# Patient Record
Sex: MALE | URBAN - METROPOLITAN AREA
[De-identification: names, ages, dates, MRNs, and addresses within clinical notes are randomized per-mention and may not be internally consistent; named-entity substitution may affect disease eponyms.]

---

## 2021-07-08 ENCOUNTER — EMERGENCY (EMERGENCY)
Facility: HOSPITAL | Age: 76
LOS: 1 days | End: 2021-07-08
Admitting: EMERGENCY MEDICINE
Payer: MEDICARE

## 2021-07-08 PROCEDURE — 73090 X-RAY EXAM OF FOREARM: CPT | Mod: 26,LT,76

## 2021-07-08 PROCEDURE — 93010 ELECTROCARDIOGRAM REPORT: CPT

## 2021-07-08 PROCEDURE — 72125 CT NECK SPINE W/O DYE: CPT | Mod: 26

## 2021-07-08 PROCEDURE — 71260 CT THORAX DX C+: CPT | Mod: 26

## 2021-07-08 PROCEDURE — 73080 X-RAY EXAM OF ELBOW: CPT | Mod: 26,LT,76

## 2021-07-08 PROCEDURE — 73130 X-RAY EXAM OF HAND: CPT | Mod: 26,RT

## 2021-07-08 PROCEDURE — 72170 X-RAY EXAM OF PELVIS: CPT | Mod: 26

## 2021-07-08 PROCEDURE — 99291 CRITICAL CARE FIRST HOUR: CPT

## 2021-07-08 PROCEDURE — 73030 X-RAY EXAM OF SHOULDER: CPT | Mod: 26,RT

## 2021-07-08 PROCEDURE — 74177 CT ABD & PELVIS W/CONTRAST: CPT | Mod: 26

## 2021-07-08 PROCEDURE — 70450 CT HEAD/BRAIN W/O DYE: CPT | Mod: 26

## 2021-07-08 PROCEDURE — 70486 CT MAXILLOFACIAL W/O DYE: CPT | Mod: 26

## 2021-07-08 PROCEDURE — 71045 X-RAY EXAM CHEST 1 VIEW: CPT | Mod: 26

## 2021-07-09 ENCOUNTER — INPATIENT (INPATIENT)
Facility: HOSPITAL | Age: 76
LOS: 2 days | Discharge: HOME | DRG: 52 | End: 2021-07-12
Attending: SURGERY | Admitting: SURGERY
Payer: MEDICARE

## 2021-07-09 VITALS
SYSTOLIC BLOOD PRESSURE: 147 MMHG | DIASTOLIC BLOOD PRESSURE: 90 MMHG | TEMPERATURE: 98 F | HEART RATE: 96 BPM | RESPIRATION RATE: 18 BRPM | OXYGEN SATURATION: 95 %

## 2021-07-09 DIAGNOSIS — S14.129A CENTRAL CORD SYNDROME AT UNSPECIFIED LEVEL OF CERVICAL SPINAL CORD, INITIAL ENCOUNTER: ICD-10-CM

## 2021-07-09 LAB
ALBUMIN SERPL ELPH-MCNC: 4.6 G/DL — SIGNIFICANT CHANGE UP (ref 3.3–5.2)
ALP SERPL-CCNC: 63 U/L — SIGNIFICANT CHANGE UP (ref 40–120)
ALT FLD-CCNC: 27 U/L — SIGNIFICANT CHANGE UP
ANION GAP SERPL CALC-SCNC: 16 MMOL/L — SIGNIFICANT CHANGE UP (ref 5–17)
APTT BLD: 33.6 SEC — SIGNIFICANT CHANGE UP (ref 27.5–35.5)
AST SERPL-CCNC: 76 U/L — HIGH
BILIRUB SERPL-MCNC: 0.5 MG/DL — SIGNIFICANT CHANGE UP (ref 0.4–2)
BUN SERPL-MCNC: 19.6 MG/DL — SIGNIFICANT CHANGE UP (ref 8–20)
CALCIUM SERPL-MCNC: 9.8 MG/DL — SIGNIFICANT CHANGE UP (ref 8.6–10.2)
CHLORIDE SERPL-SCNC: 99 MMOL/L — SIGNIFICANT CHANGE UP (ref 98–107)
CO2 SERPL-SCNC: 18 MMOL/L — LOW (ref 22–29)
CREAT SERPL-MCNC: 0.8 MG/DL — SIGNIFICANT CHANGE UP (ref 0.5–1.3)
GLUCOSE SERPL-MCNC: 128 MG/DL — HIGH (ref 70–99)
HCT VFR BLD CALC: 42.2 % — SIGNIFICANT CHANGE UP (ref 39–50)
HGB BLD-MCNC: 14.8 G/DL — SIGNIFICANT CHANGE UP (ref 13–17)
INR BLD: 0.9 RATIO — SIGNIFICANT CHANGE UP (ref 0.88–1.16)
LACTATE SERPL-SCNC: 1.1 MMOL/L — SIGNIFICANT CHANGE UP (ref 0.5–2)
MCHC RBC-ENTMCNC: 32.2 PG — SIGNIFICANT CHANGE UP (ref 27–34)
MCHC RBC-ENTMCNC: 35.1 GM/DL — SIGNIFICANT CHANGE UP (ref 32–36)
MCV RBC AUTO: 91.9 FL — SIGNIFICANT CHANGE UP (ref 80–100)
PLATELET # BLD AUTO: 339 K/UL — SIGNIFICANT CHANGE UP (ref 150–400)
POTASSIUM SERPL-MCNC: 4.1 MMOL/L — SIGNIFICANT CHANGE UP (ref 3.5–5.3)
POTASSIUM SERPL-SCNC: 4.1 MMOL/L — SIGNIFICANT CHANGE UP (ref 3.5–5.3)
PROT SERPL-MCNC: 7 G/DL — SIGNIFICANT CHANGE UP (ref 6.6–8.7)
PROTHROM AB SERPL-ACNC: 10.5 SEC — LOW (ref 10.6–13.6)
RBC # BLD: 4.59 M/UL — SIGNIFICANT CHANGE UP (ref 4.2–5.8)
RBC # FLD: 12.7 % — SIGNIFICANT CHANGE UP (ref 10.3–14.5)
SARS-COV-2 RNA SPEC QL NAA+PROBE: SIGNIFICANT CHANGE UP
SODIUM SERPL-SCNC: 133 MMOL/L — LOW (ref 135–145)
WBC # BLD: 10.22 K/UL — SIGNIFICANT CHANGE UP (ref 3.8–10.5)
WBC # FLD AUTO: 10.22 K/UL — SIGNIFICANT CHANGE UP (ref 3.8–10.5)

## 2021-07-09 PROCEDURE — 93010 ELECTROCARDIOGRAM REPORT: CPT

## 2021-07-09 PROCEDURE — 73130 X-RAY EXAM OF HAND: CPT | Mod: 26,LT

## 2021-07-09 PROCEDURE — 72157 MRI CHEST SPINE W/O & W/DYE: CPT | Mod: 26,MA

## 2021-07-09 PROCEDURE — 99223 1ST HOSP IP/OBS HIGH 75: CPT | Mod: GC

## 2021-07-09 PROCEDURE — 99285 EMERGENCY DEPT VISIT HI MDM: CPT

## 2021-07-09 PROCEDURE — 72156 MRI NECK SPINE W/O & W/DYE: CPT | Mod: 26

## 2021-07-09 PROCEDURE — 99222 1ST HOSP IP/OBS MODERATE 55: CPT

## 2021-07-09 PROCEDURE — 73130 X-RAY EXAM OF HAND: CPT | Mod: 26,RT,77

## 2021-07-09 RX ORDER — GABAPENTIN 400 MG/1
100 CAPSULE ORAL ONCE
Refills: 0 | Status: COMPLETED | OUTPATIENT
Start: 2021-07-09 | End: 2021-07-09

## 2021-07-09 RX ORDER — DEXTROSE 50 % IN WATER 50 %
25 SYRINGE (ML) INTRAVENOUS ONCE
Refills: 0 | Status: DISCONTINUED | OUTPATIENT
Start: 2021-07-09 | End: 2021-07-09

## 2021-07-09 RX ORDER — GABAPENTIN 400 MG/1
300 CAPSULE ORAL EVERY 8 HOURS
Refills: 0 | Status: DISCONTINUED | OUTPATIENT
Start: 2021-07-09 | End: 2021-07-12

## 2021-07-09 RX ORDER — GABAPENTIN 400 MG/1
300 CAPSULE ORAL EVERY 8 HOURS
Refills: 0 | Status: DISCONTINUED | OUTPATIENT
Start: 2021-07-09 | End: 2021-07-09

## 2021-07-09 RX ORDER — NICOTINE POLACRILEX 2 MG
1 GUM BUCCAL DAILY
Refills: 0 | Status: DISCONTINUED | OUTPATIENT
Start: 2021-07-09 | End: 2021-07-12

## 2021-07-09 RX ORDER — HYDROMORPHONE HYDROCHLORIDE 2 MG/ML
0.5 INJECTION INTRAMUSCULAR; INTRAVENOUS; SUBCUTANEOUS ONCE
Refills: 0 | Status: DISCONTINUED | OUTPATIENT
Start: 2021-07-09 | End: 2021-07-09

## 2021-07-09 RX ORDER — SODIUM CHLORIDE 9 MG/ML
1000 INJECTION, SOLUTION INTRAVENOUS
Refills: 0 | Status: DISCONTINUED | OUTPATIENT
Start: 2021-07-09 | End: 2021-07-09

## 2021-07-09 RX ORDER — SODIUM CHLORIDE 9 MG/ML
1000 INJECTION INTRAMUSCULAR; INTRAVENOUS; SUBCUTANEOUS
Refills: 0 | Status: DISCONTINUED | OUTPATIENT
Start: 2021-07-09 | End: 2021-07-10

## 2021-07-09 RX ORDER — PANTOPRAZOLE SODIUM 20 MG/1
40 TABLET, DELAYED RELEASE ORAL DAILY
Refills: 0 | Status: DISCONTINUED | OUTPATIENT
Start: 2021-07-09 | End: 2021-07-09

## 2021-07-09 RX ORDER — LANOLIN ALCOHOL/MO/W.PET/CERES
3 CREAM (GRAM) TOPICAL AT BEDTIME
Refills: 0 | Status: DISCONTINUED | OUTPATIENT
Start: 2021-07-09 | End: 2021-07-10

## 2021-07-09 RX ORDER — GLUCAGON INJECTION, SOLUTION 0.5 MG/.1ML
1 INJECTION, SOLUTION SUBCUTANEOUS ONCE
Refills: 0 | Status: DISCONTINUED | OUTPATIENT
Start: 2021-07-09 | End: 2021-07-09

## 2021-07-09 RX ORDER — ACETAMINOPHEN 500 MG
650 TABLET ORAL EVERY 6 HOURS
Refills: 0 | Status: DISCONTINUED | OUTPATIENT
Start: 2021-07-09 | End: 2021-07-10

## 2021-07-09 RX ORDER — LANOLIN ALCOHOL/MO/W.PET/CERES
5 CREAM (GRAM) TOPICAL ONCE
Refills: 0 | Status: COMPLETED | OUTPATIENT
Start: 2021-07-09 | End: 2021-07-09

## 2021-07-09 RX ORDER — DEXTROSE 50 % IN WATER 50 %
15 SYRINGE (ML) INTRAVENOUS ONCE
Refills: 0 | Status: DISCONTINUED | OUTPATIENT
Start: 2021-07-09 | End: 2021-07-09

## 2021-07-09 RX ORDER — DEXTROSE 50 % IN WATER 50 %
12.5 SYRINGE (ML) INTRAVENOUS ONCE
Refills: 0 | Status: DISCONTINUED | OUTPATIENT
Start: 2021-07-09 | End: 2021-07-09

## 2021-07-09 RX ORDER — ENOXAPARIN SODIUM 100 MG/ML
30 INJECTION SUBCUTANEOUS
Refills: 0 | Status: DISCONTINUED | OUTPATIENT
Start: 2021-07-09 | End: 2021-07-12

## 2021-07-09 RX ORDER — TRAMADOL HYDROCHLORIDE 50 MG/1
50 TABLET ORAL ONCE
Refills: 0 | Status: DISCONTINUED | OUTPATIENT
Start: 2021-07-09 | End: 2021-07-09

## 2021-07-09 RX ORDER — MORPHINE SULFATE 50 MG/1
4 CAPSULE, EXTENDED RELEASE ORAL ONCE
Refills: 0 | Status: DISCONTINUED | OUTPATIENT
Start: 2021-07-09 | End: 2021-07-09

## 2021-07-09 RX ORDER — DEXAMETHASONE 0.5 MG/5ML
4 ELIXIR ORAL EVERY 6 HOURS
Refills: 0 | Status: DISCONTINUED | OUTPATIENT
Start: 2021-07-09 | End: 2021-07-12

## 2021-07-09 RX ORDER — PANTOPRAZOLE SODIUM 20 MG/1
40 TABLET, DELAYED RELEASE ORAL
Refills: 0 | Status: DISCONTINUED | OUTPATIENT
Start: 2021-07-09 | End: 2021-07-12

## 2021-07-09 RX ORDER — KETOROLAC TROMETHAMINE 30 MG/ML
15 SYRINGE (ML) INJECTION ONCE
Refills: 0 | Status: DISCONTINUED | OUTPATIENT
Start: 2021-07-09 | End: 2021-07-09

## 2021-07-09 RX ORDER — INSULIN LISPRO 100/ML
VIAL (ML) SUBCUTANEOUS
Refills: 0 | Status: DISCONTINUED | OUTPATIENT
Start: 2021-07-09 | End: 2021-07-10

## 2021-07-09 RX ADMIN — MORPHINE SULFATE 4 MILLIGRAM(S): 50 CAPSULE, EXTENDED RELEASE ORAL at 03:26

## 2021-07-09 RX ADMIN — Medication 650 MILLIGRAM(S): at 21:27

## 2021-07-09 RX ADMIN — Medication 4 MILLIGRAM(S): at 23:24

## 2021-07-09 RX ADMIN — GABAPENTIN 100 MILLIGRAM(S): 400 CAPSULE ORAL at 08:55

## 2021-07-09 RX ADMIN — Medication 15 MILLIGRAM(S): at 15:29

## 2021-07-09 RX ADMIN — GABAPENTIN 300 MILLIGRAM(S): 400 CAPSULE ORAL at 14:06

## 2021-07-09 RX ADMIN — HYDROMORPHONE HYDROCHLORIDE 0.5 MILLIGRAM(S): 2 INJECTION INTRAMUSCULAR; INTRAVENOUS; SUBCUTANEOUS at 14:05

## 2021-07-09 RX ADMIN — HYDROMORPHONE HYDROCHLORIDE 0.5 MILLIGRAM(S): 2 INJECTION INTRAMUSCULAR; INTRAVENOUS; SUBCUTANEOUS at 14:17

## 2021-07-09 RX ADMIN — Medication 15 MILLIGRAM(S): at 21:15

## 2021-07-09 RX ADMIN — ENOXAPARIN SODIUM 30 MILLIGRAM(S): 100 INJECTION SUBCUTANEOUS at 17:44

## 2021-07-09 RX ADMIN — Medication 650 MILLIGRAM(S): at 22:30

## 2021-07-09 RX ADMIN — Medication 650 MILLIGRAM(S): at 14:06

## 2021-07-09 RX ADMIN — Medication 4 MILLIGRAM(S): at 17:45

## 2021-07-09 RX ADMIN — Medication 15 MILLIGRAM(S): at 15:32

## 2021-07-09 RX ADMIN — GABAPENTIN 300 MILLIGRAM(S): 400 CAPSULE ORAL at 21:25

## 2021-07-09 RX ADMIN — Medication 5 MILLIGRAM(S): at 23:51

## 2021-07-09 RX ADMIN — MORPHINE SULFATE 4 MILLIGRAM(S): 50 CAPSULE, EXTENDED RELEASE ORAL at 03:56

## 2021-07-09 RX ADMIN — Medication 4 MILLIGRAM(S): at 14:18

## 2021-07-09 RX ADMIN — Medication 650 MILLIGRAM(S): at 14:17

## 2021-07-09 RX ADMIN — Medication 3 MILLIGRAM(S): at 21:25

## 2021-07-09 RX ADMIN — Medication 15 MILLIGRAM(S): at 20:44

## 2021-07-09 RX ADMIN — PANTOPRAZOLE SODIUM 40 MILLIGRAM(S): 20 TABLET, DELAYED RELEASE ORAL at 14:05

## 2021-07-09 NOTE — CONSULT NOTE ADULT - ASSESSMENT
Patient is a 76y old Male w/ HTN and HLD presents as transfer from [facility] for further evaluation of suspected central cord syndrome. Work-up revealed no acute injuries and MRI performed here shows active degenerative disease. Abrasions to left side of face with noted hemostasis. No acute, surgical injuries identified.     #S/p Fall Down Steps  - No acute operative injuries identified.   - Fascial abrasions do not require repair, local wound care.  - XR of Bilateral hands without obvious fracture, to follow up final read  - Tertiary exam  - Neurosurgery consulted    Plan discussed with Dr. Pérez.   Consulted at 8:45, seen and examined at 9:40.

## 2021-07-09 NOTE — H&P ADULT - NSHPPHYSICALEXAM_GEN_ALL_CORE
General: NAD  HEENT: Normocephalic, EOMI, Abrasions over left forehead, periorbital region, and lips. Hemostasis noted. No laceration for repair.  Neck: Soft collar in place, midline trachea.  Chest: No chest wall tenderness.   Cardiac: RRR  Respiratory: Unlabored, no conversational dyspnea.   Abdomen: Soft, non-distended, non-tender TTP periumbilically, no rebound, +guarding  Groin: Normal appearing  Ext: Right upper extremity with some contracture as baseline. Left upper extremity with flexion, motor function intact. Hypersensitive from elbow to hand bilaterally. WWP  Back: no TTP, no palpable runoff/stepoff/deformity

## 2021-07-09 NOTE — H&P ADULT - HISTORY OF PRESENT ILLNESS
Patient is a 76y old  Male w/ reported PMH of HTN and hLD presents as transfer from [facility] for further evaluation of suspected central cord syndrome. Patient was babysitting his grandchildren when he fell forward down 3 concrete steps, landing on his face and hands. Did not lose consciousness. Was able to ambulate afterwards. Complaining of pain at both hands with hypersensitivity from elbow to hand bilaterally. Work-up at [facility] yielded no acute injuries, transferred to [facility] for MRI. Denies Nausea, vomiting, fever, chills, chest pain, or SOB. C-collar switched for soft collar.     Primary Survey:   A - airway intact, having conversation  B - Unlabored, no conversational dyspnea  C - initial BP  BP: 134/89 (07-09-21 @ 06:33) *** , HR HR: 90 (07-09-21 @ 06:33) *** ,  D - GCS 15 on arrival  Exposure obtained

## 2021-07-09 NOTE — ED ADULT NURSE NOTE - NSIMPLEMENTINTERV_GEN_ALL_ED
Implemented All Fall Risk Interventions:  [place] to call system. Call bell, personal items and telephone within reach. Instruct patient to call for assistance. Room bathroom lighting operational. Non-slip footwear when patient is off stretcher. Physically safe environment: no spills, clutter or unnecessary equipment. Stretcher in lowest position, wheels locked, appropriate side rails in place. Provide visual cue, wrist band, yellow gown, etc. Monitor gait and stability. Monitor for mental status changes and reorient to person, place, and time. Review medications for side effects contributing to fall risk. Reinforce activity limits and safety measures with patient and family.

## 2021-07-09 NOTE — CONSULT NOTE ADULT - SUBJECTIVE AND OBJECTIVE BOX
CC: Patient is a 76y old  Male who presents with a chief complaint of   SOURCE:   HPI:  Patient is a 76y old  Male who presents S/P  trans from [facility]  fall to face    pt c/o + -headache  /10 on the pain scale   + - Nausea /+ - Vomiting  admits denies weakness  admits denies numbness/ tingling  admist denies visual changes  admist denies C/T/LS  Spine pain    PAST MEDICAL:     SURGICAL HISTORY:      SOCIAL HISTORY: +  EtOH, + - tobacco, + - drugs    FAMILY HISTORY:      ROS:  CONSTITUTIONAL: No fever, weight loss, or fatigue  EYES: No eye pain, visual disturbances, or discharge  ENMT:  No difficulty hearing, tinnitus, vertigo; No sinus or throat pain  NECK: No pain or stiffness  RESPIRATORY: No cough, wheezing, chills or hemoptysis; No shortness of breath  CARDIOVASCULAR: No chest pain, palpitations, dizziness, or leg swelling  GASTROINTESTINAL: No abdominal or epigastric pain. No nausea, vomiting, or hematemesis; No diarrhea or constipation. No melena or hematochezia.  GENITOURINARY: No dysuria, frequency, hematuria, or incontinence  NEUROLOGICAL: No headaches, memory loss, loss of strength, numbness, or tremors  SKIN: No itching, burning, rashes, or lesions   LYMPH NODES: No enlarged glands  ENDOCRINE: No heat or cold intolerance; No hair loss  MUSCULOSKELETAL: No joint pain or swelling; No muscle, back, or extremity pain  PSYCHIATRIC: No depression, anxiety, mood swings, or difficulty sleeping  HEME/LYMPH: No easy bruising, or bleeding gums  ALLERY AND IMMUNOLOGIC: No hives or eczema      MEDICATIONS  (STANDING):    MEDICATIONS  (PRN):      Allergies: No Known Allergies            Vital Signs Last 24 Hrs  T(C): 36.7 (09 Jul 2021 01:49), Max: 36.7 (09 Jul 2021 01:49)  T(F): 98.1 (09 Jul 2021 01:49), Max: 98.1 (09 Jul 2021 01:49)  HR: 96 (09 Jul 2021 01:49) (96 - 96)  BP: 147/90 (09 Jul 2021 01:49) (147/90 - 147/90)  BP(mean): --  RR: 18 (09 Jul 2021 01:49) (18 - 18)  SpO2: 95% (09 Jul 2021 01:49) (95% - 95%)    PHYSICAL EXAM:  GENERAL: NAD, well-groomed, well-developed  HEAD:  Atraumatic, Normocephalic  EYES: EOMI, PERRLA, conjunctiva and sclera clear  ENMT: Moist mucous membranes, Good dentition  NECK: Supple, No JVD  NERVOUS SYSTEM:  Alert & Oriented X3, Good concentration;     Motor Strength 5/5 B/L upper and lower extremities;     SPINE:   CHEST/LUNG: Clear  bilaterally; No rales, rhonchi, wheezing, or rubs  HEART: Regular rate   ABDOMEN: Soft, Nontender, Nondistended; Bowel sounds present  EXTREMITIES:  2+ Peripheral Pulses, No clubbing, cyanosis, or edema  LYMPH: No lymphadenopathy noted  SKIN: No rashes or lesions      RADIOLOGY & ADDITIONAL STUDIES:                   CC: Patient is a 76y old  Male who presents with a chief complaint of   SOURCE: Patient himself, competent  HPI:  Patient is a 76y old  Male who presents S/P  trans from [facility]  fall to face. Tripped  down 3 steps, fell onto left face onto stone, denies LOC.   Admits to drinking wine.     gcs=15  C/o severe pain bilateral arms, mid forearm to all fingers. Denies new weakness, pain is so severe it limits motor.   Patient has right arm/hand injury 1968 no use of right hand since.      -headache    - Nausea / - Vomiting  denies weakness  admits  numbness/ tingling BLUE, MID FOREARM DISTALLY TO ALL FINGERS   denies visual changes   denies C/T/LS  Spine pain    PAST MEDICAL:  HTN  HDL  Left rotator cuff tear     SURGICAL HISTORY:  none    SOCIAL HISTORY: +  EtOH 1 bottle of wine per day x's 60 yrs,  + tobacco 1 ppd x's 60 yrs, +  drugs cocaine, marijuana 1968    FAMILY HISTORY:  Father: lymphoma  Mother: cardiac    ROS:  CONSTITUTIONAL: No fever, weight loss, or fatigue  EYES: No eye pain, visual disturbances, or discharge  ENMT:  No difficulty hearing, tinnitus, vertigo; No sinus or throat pain  NECK: No pain or stiffness  RESPIRATORY: No cough, wheezing, chills or hemoptysis; No shortness of breath  CARDIOVASCULAR: No chest pain, palpitations, dizziness, or leg swelling  GASTROINTESTINAL: No abdominal or epigastric pain. No nausea, vomiting, or hematemesis; No diarrhea or constipation. No melena or hematochezia.  GENITOURINARY: No dysuria, frequency, hematuria, or incontinence  NEUROLOGICAL: No headaches, memory loss, loss of strength, numbness, or tremors  SKIN: No itching, burning, rashes, or lesions   LYMPH NODES: No enlarged glands  ENDOCRINE: No heat or cold intolerance; No hair loss  MUSCULOSKELETAL: No joint pain or swelling; No muscle, back, or extremity pain  PSYCHIATRIC: No depression, anxiety, mood swings, or difficulty sleeping  HEME/LYMPH: No easy bruising, or bleeding gums  ALLERGY AND IMMUNOLOGIC: No hives or eczema      MEDICATIONS  (STANDING):  losartan 100 qd, amlodipine 5mgqd, atorvastatin 40 qhs, hctz 25qd, atenolol 25qd, ezetimibe 10 qd, pantoprazole 40 bid       Allergies: No Known Allergies        Vital Signs Last 24 Hrs  T(C): 36.7 (09 Jul 2021 01:49), Max: 36.7 (09 Jul 2021 01:49)  T(F): 98.1 (09 Jul 2021 01:49), Max: 98.1 (09 Jul 2021 01:49)  HR: 96 (09 Jul 2021 01:49) (96 - 96)  BP: 147/90 (09 Jul 2021 01:49) (147/90 - 147/90)  BP(mean): --  RR: 18 (09 Jul 2021 01:49) (18 - 18)  SpO2: 95% (09 Jul 2021 01:49) (95% - 95%)    PHYSICAL EXAM:  GENERAL: NAD, well-groomed, well-developed  HEAD:  Left face, periorbital abrasions, edema, Normocephalic  EYES: EOMI, PERRLA, conjunctiva and sclera clear  ENMT: Moist mucous membranes,+ dentures   NECK: c collar in place   NERVOUS SYSTEM:  Alert & Oriented X3, Good concentration;   per, eomi,  cranial nerves 2-12 intact  gross visual acuity and fields intact   Motor Strength 5/5 B/L  lower extremities;   RUE: Flexion deformity at wrist, fingers with deformity and fixed,  LUE; + weak motor all fingers with sever pain, motor limited by pain.   sever hyperesthesia to light touch BLUE mid for arm distally to all fingers.   SPINE: no C/T/L/S spine tenderness  CHEST/LUNG: Clear  bilaterally; No rales, rhonchi, wheezing, or rubs, no sternal or rib tenderness  HEART: Regular rate   ABDOMEN: Soft, Nontender, Nondistended; Bowel sounds present  EXTREMITIES:  2+ Peripheral Pulses DP and Radial, No clubbing, cyanosis, or edema, right hand with deformity and fixed joints, all   LYMPH: No lymphadenopathy noted  SKIN: No rashes or lesions      RADIOLOGY & ADDITIONAL STUDIES:                   CC: Patient is a 76y old  Male who presents with a chief complaint of   SOURCE: Patient himself, competent  HPI:  Patient is a 76y old  Male who presents S/P  trans from [facility]  fall to face. Tripped  down 3 steps, fell onto left face onto stone, denies LOC.   Admits to drinking wine.     gcs=15  C/o severe pain bilateral arms, mid forearm to all fingers. Denies new weakness, pain is so severe it limits motor.   Patient has right arm/hand injury 1968 no use of right hand since.      -headache    - Nausea / - Vomiting  denies weakness  admits  numbness/ tingling BLUE, MID FOREARM DISTALLY TO ALL FINGERS   denies visual changes   denies C/T/LS  Spine pain    PAST MEDICAL:  HTN  HDL  Left rotator cuff tear     SURGICAL HISTORY:  none    SOCIAL HISTORY: +  EtOH 1 bottle of wine per day x's 60 yrs,  + tobacco 1 ppd x's 60 yrs, +  drugs cocaine, marijuana 1968    FAMILY HISTORY:  Father: lymphoma  Mother: cardiac    ROS:  CONSTITUTIONAL: No fever, weight loss, or fatigue  EYES: No eye pain, visual disturbances, or discharge  ENMT:  No difficulty hearing, tinnitus, vertigo; No sinus or throat pain  NECK: No pain or stiffness  RESPIRATORY: No cough, wheezing, chills or hemoptysis; No shortness of breath  CARDIOVASCULAR: No chest pain, palpitations, dizziness, or leg swelling  GASTROINTESTINAL: No abdominal or epigastric pain. No nausea, vomiting, or hematemesis; No diarrhea or constipation. No melena or hematochezia.  GENITOURINARY: No dysuria, frequency, hematuria, or incontinence  NEUROLOGICAL: No headaches, memory loss, loss of strength, numbness, or tremors  SKIN: No itching, burning, rashes, or lesions   LYMPH NODES: No enlarged glands  ENDOCRINE: No heat or cold intolerance; No hair loss  MUSCULOSKELETAL: No joint pain or swelling; No muscle, back, or extremity pain  PSYCHIATRIC: No depression, anxiety, mood swings, or difficulty sleeping  HEME/LYMPH: No easy bruising, or bleeding gums  ALLERGY AND IMMUNOLOGIC: No hives or eczema      MEDICATIONS  (STANDING):  losartan 100 qd, amlodipine 5mgqd, atorvastatin 40 qhs, hctz 25qd, atenolol 25qd, ezetimibe 10 qd, pantoprazole 40 bid       Allergies: No Known Allergies        Vital Signs Last 24 Hrs  T(C): 36.7 (09 Jul 2021 01:49), Max: 36.7 (09 Jul 2021 01:49)  T(F): 98.1 (09 Jul 2021 01:49), Max: 98.1 (09 Jul 2021 01:49)  HR: 96 (09 Jul 2021 01:49) (96 - 96)  BP: 147/90 (09 Jul 2021 01:49) (147/90 - 147/90)  BP(mean): --  RR: 18 (09 Jul 2021 01:49) (18 - 18)  SpO2: 95% (09 Jul 2021 01:49) (95% - 95%)    PHYSICAL EXAM:  GENERAL: NAD, well-groomed, well-developed  HEAD:  Left face, periorbital abrasions, edema, Normocephalic  EYES: EOMI, PERRLA, conjunctiva and sclera clear  ENMT: Moist mucous membranes,+ dentures   NECK: c collar in place   NERVOUS SYSTEM:  Alert & Oriented X3, Good concentration;   per, eomi,  cranial nerves 2-12 intact  gross visual acuity and fields intact   Motor Strength 5/5 B/L  lower extremities;   RUE: Flexion deformity at wrist, fingers with deformity and fixed,  LUE; + weak motor all fingers with sever pain, motor limited by pain.   sever hyperesthesia to light touch BLUE mid for arm distally to all fingers.   SPINE: no C/T/L/S spine tenderness  CHEST/LUNG: Clear  bilaterally; No rales, rhonchi, wheezing, or rubs, no sternal or rib tenderness  HEART: Regular rate   ABDOMEN: Soft, Nontender, Nondistended; Bowel sounds present  EXTREMITIES:  2+ Peripheral Pulses DP and Radial, No clubbing, cyanosis, or edema, right hand with deformity and fixed joints, all   LYMPH: No lymphadenopathy noted  SKIN: No rashes or lesions      RADIOLOGY & ADDITIONAL STUDIES:      EXAM: MR SPINE CERVICAL  PROCEDURE DATE: 07/09/2021  INTERPRETATION: CLINICAL HISTORY: Status post fall. Bilateral upper extremity pain. Pt was at daughters house babysitting grandkids when he had mechanical trip and fall down 3 cement steps, landing forward on bricks with current c/o b/l hand pain, numbness and tingling. Of note, pt is [place] war vet, reports shrapnel injury to hand.  TECHNIQUE: MRI of the cervical spine without IV contrast.  Sagittal and transaxial T1 and T2-weighted images as well as sagittal STIR images were acquired from the occiput through to T2  COMPARISON: Cervical spine CT 7/8/2021  FINDINGS:  There is straightening of the normal cervical lordosis with slight anterior listhesis of C4 on C5, unchanged. There is decreased T1 and T2 signal at the inferior endplate of C5 superior endplate of C6 with marked intervertebral disc space narrowing compatible with chronic degenerative changes. Superimposed surrounding mild T2/STIR hyperintense signal may reflect edema from active endplate degenerative change. No corresponding abnormality is seen on the cervical spine CT to suggest edema from fracture. The vertebral body heights are maintained.  There is no evidence of prevertebral soft tissue swelling or epidural hematoma. There is no cord signal abnormality. There is flattening of the ventral hemicord at C5/C6 related to severe canal stenosis and chronic cord compression.  There are multilevel degenerative changes including intervertebral disc space narrowing, disc desiccation changes, disc osteophyte complexes, facet arthropathy and ligamentum flavum thickening. Evaluation of the individual levels demonstrates:  C2/C3 and C3/C4: No significant disc herniation, canal stenosis or foraminal narrowing.  C4/C5: Small central disc herniation, facet arthropathy and ligamentum flavum thickening contributes to mild canal stenosis and moderate bilateral neural foraminal narrowing.  C5/C6 and C6/C7: Small to moderate disc osteophyte complexes, facet arthropathy and ligamentum flavum thickening contributes to severe canal stenosis. There is flattening of the ventral hemicord without cord signal abnormality compatible with chronic compression. Severe bilateral neural foraminal narrowing.  The ligamentous structures are intact.  The visualized soft tissues of the neck are unremarkable. Flow voids of the vertebral arteries are maintained.  The paraspinal musculature is unremarkable.  IMPRESSION:  No acute fracture or traumatic malalignment. Edema in the C5 and C6 vertebral bodies likely related to acute on chronic advanced degenerative change.  Cervical spondylosis with severe canal stenosis C5/C6 and C6/C7 and chronic flattening of the ventral hemicord. No cord signal abnormality.      EXAM: CT ABDOMEN AND PELVIS IC  EXAM: CT CHEST IC  PROCEDURE DATE: 07/08/2021  INTERPRETATION: CLINICAL INFORMATION: Trauma. Fall.  COMPARISON: None.  CONTRAST/COMPLICATIONS:  IV Contrast: Omnipaque 350 90 cc administered 10 cc discarded  Oral Contrast: NONE  Complications: None reported at time of study completion  PROCEDURE:  CT of the Chest, Abdomen and Pelvis was performed.  Imaging was performed through the chest in the arterial phase followed by imaging of the abdomen and pelvis in the portal venous phase.  Sagittal and coronal reformats were performed.  FINDINGS:  CHEST:  LUNGS AND LARGE AIRWAYS: Patent central airways. Lungs are clear.  PLEURA: No pleural effusion.  VESSELS: Thoracic aorta normal in caliber. Atherosclerotic changes in the thoracic aorta including penetrating ulcers in the descending thoracic aorta. Coronary artery calcifications.  HEART: Heart size is normal. No pericardial effusion.  MEDIASTINUM AND JOEL: No lymphadenopathy.  CHEST WALL AND LOWER NECK: No enlarged axillary lymph nodes.  ABDOMEN AND PELVIS:  LIVER: Within normal limits.  BILE DUCTS: Normal caliber.  GALLBLADDER: Within normal limits.  SPLEEN: Within normal limits.  PANCREAS: Within normal limits.  ADRENALS: Within normal limits.  KIDNEYS/URETERS: No hydronephrosis. Small renal cysts and subcentimeter low-attenuation renal lesions, too small to characterize. Scarring in the upper pole of the left kidney.  BLADDER: Distended with a normal caliber wall.  REPRODUCTIVE ORGANS: Enlarged prostate.  BOWEL: Colonic diverticulosis without evidence of diverticulitis. No bowel obstruction. Appendix is normal.  PERITONEUM: No ascites.  VESSELS: Extensive atherosclerotic changes including severe plaque in the left common iliac artery with an associated high-grade stenosis or occlusion. Left common iliac artery is patent more distally. Focal ectasia of the infrarenal abdominal aorta measuring 2.2 cm anteroposterior. Severe calcified plaque in the left common femoral artery.  RETROPERITONEUM/LYMPH NODES: No lymphadenopathy.  ABDOMINAL WALL: Tiny fat-containing umbilical hernia. Fat-containing left inguinal hernia.  BONES: No acute fracture. Degenerative changes in the spine, most severe at L5-S1. 2.9 cm intramuscular lipoma in the right axillary region.  IMPRESSION:  No acute intrathoracic or intra-abdominal sequelae of trauma.  Atherosclerotic changes as described above.      EXAM: CT CERVICAL SPINE  EXAM: CT MAXILLOFACIAL  EXAM: CT BRAIN    PROCEDURE DATE: 07/08/2021          INTERPRETATION: CLINICAL INFORMATION: Trauma    TECHNIQUE:  1. Axial CT images were acquired through the head.  2. Axial CT images were acquired through the maxillofacial bones.  3. Axial CT images were acquired through the cervical spine.  Intravenous contrast: None  Two-dimensional reformats were generated.    COMPARISON STUDY: None    FINDINGS:    CT HEAD:    There is no CT evidence of acute intracranial hemorrhage, mass effect, midline shift, or acute, large territorial infarct. The ventricles and sulci are age-appropriate in size and configuration. The basal cisterns are patent.    Mild patchy periventricular and subcortical white matter hypodensities are nonspecific, although likely on the basis of chronic small vessel ischemic disease.    There are atherosclerotic calcifications at the skull base.    There are minimal bilateral mastoid air cell effusions, although the middle ear cavities are grossly clear. There is sclerosis of the bilateral mastoid tips. There is trace lobular mucosal thickening in the bilateral maxillary sinuses. The remaining visualized paranasal sinuses are well aerated.    The calvarium and skull base are grossly intact. .    CT MAXILLOFACIAL BONES:  There is soft tissue swelling/hemorrhage in the left periorbital and malar regions.  There is no acute maxillofacial bone fracture.  The mandible is intact. The temporomandibular joints are not dislocated.  The nasal septum is grossly intact. There is no abnormal soft tissue within the nasal cavity.  There is no gross CT evidence of traumatic globe injury. The optic globes are smooth and symmetric in contour. The retrobulbar and extraconal fat is well-preserved. The extraocular muscles are not enlarged or deviated.  Incidental findings:  There are atherosclerotic carotid calcifications and partially retropharyngeal course of the bilateral cervical internal carotid arteries.    CT CERVICAL SPINE:  There is nonspecific straightening of the cervical lordosis.  There is no evidence of an acute cervical spine fracture or traumatic malalignment.  Sclerosis of the C5 and C6 vertebral bodies likely degenerative with disc space narrowing and mild endplate irregularity.  There is no suspicious lytic or blastic lesion.  The paraspinous soft tissues are unremarkable within limits of CT scan.  Degenerative changes:  Grade 1 anterolisthesis of C4 on C5. Multilevel degenerative changes, with bilateral uncovertebral and facet hypertrophy contributing to neural foraminal stenosis most severe at C5-C6.  Incidental findings:  Visualized upper chest appears unremarkable.  IMPRESSION:  CT HEAD: No acute intracranial hemorrhage, mass effect, or osseous fracture.  CT MAXILLOFACIAL BONES: No acute maxillofacial bone or mandibular fracture.  CT CERVICAL SPINE: No acute cervical spine fracture or traumatic malalignment.

## 2021-07-09 NOTE — ED PROVIDER NOTE - OBJECTIVE STATEMENT
75y/o M with PMHx of Carpal Tunnel, HTN, HLD presents to the ED, transferred from [facility] for MRI. Pt was at daughters house babysitting grandkids when he had mechanical trip and fall down 3 cement steps, landing forward on bricks with current c/o b/l hand pain, numbness and tingling. Of note, pt is [place] war vet, reports shrapnel injury to hand. Pt admits to drinking wine daily, no hx of withdrawals. Denies neck pain.

## 2021-07-09 NOTE — CONSULT NOTE ADULT - ASSESSMENT
IMP:      PLAN:  IMP:  FALL 3 STEPS ONTO STONE, LEFT FACE  SEVERE PAIN AND HYPERESTHESIA BLUE MID FOREARMS DISTALLY TO ALL FINGERS.   OLD INJURY R HAND WITH DEFORMITY AND FIXED JOINTS    RADIAL PULSES INTACT  PATIENT STATES MOTOR IS LIMITED BY PAIN.         PLAN:   MRI C AND T SPINE +/- CONT  C COLLAR ALL TIMES   KEEP MAPS>85   IMP:  FALL 3 STEPS ONTO STONE, LEFT FACE  SEVERE PAIN AND HYPERESTHESIA BLUE MID FOREARMS DISTALLY TO ALL FINGERS.   OLD INJURY R HAND WITH DEFORMITY AND FIXED JOINTS    RADIAL PULSES INTACT  PATIENT STATES MOTOR IS LIMITED BY PAIN.     MRI C SPINE; No acute fracture or traumatic malalignment. Edema in the C5 and C6 vertebral bodies likely related to acute on chronic advanced degenerative change.  Cervical spondylosis with severe canal stenosis C5/C6 and C6/C7 and chronic flattening of the ventral hemicord. No cord signal abnormality.  CT HEAD: No acute intracranial hemorrhage, mass effect, or osseous fracture.  CT MAXILLOFACIAL BONES: No acute maxillofacial bone or mandibular fracture.  CT CERVICAL SPINE: No acute cervical spine fracture or traumatic malalignment.    PLAN:   MRI C AND T SPINE +/- CONT  C COLLAR ALL TIMES   KEEP MAPS>85

## 2021-07-09 NOTE — CONSULT NOTE ADULT - SUBJECTIVE AND OBJECTIVE BOX
TRAUMA SERVICE  - CONSULT NOTE  --------------------------------------------------------------------------------------------    TRAUMA ACTIVATION LEVEL: Consult    MECHANISM OF INJURY:      [x] Blunt  	[] MVC	[x] Fall	[] Pedestrian Struck	[] Motorcycle accident   - Down 3 steps     [] Penetrating  	[] Gun Shot Wound 		[] Stab Wound    GCS: 15 	E: 4	V: 5	M: 6        HPI: Patient is a 76y old  Male w/ reported PMH of HTN and hLD presents as transfer from [facility] for further evaluation of suspected central cord syndrome. Patient was babysitting his grandchildren when he fell forward down 3 concrete steps, landing on his face and hands. Did not lose consciousness. Was able to ambulate afterwards. Complaining of pain at both hands with hypersensitivity from elbow to hand bilaterally. [place] [facility] [place] acute [place], transferred to [facility] for MRI. Denies Nausea, vomiting, fever, chills, chest pain, or SOB. C-collar switched for soft collar.           Primary Survey:   A - airway intact, having conversation  B - Unlabored, no conversational dyspnea  C - initial BP  BP: 134/89 (07-09-21 @ 06:33) *** , HR HR: 90 (07-09-21 @ 06:33) *** ,  D - GCS 15 on arrival  Exposure obtained        ROS: 10-system review is otherwise negative except HPI above.      PAST MEDICAL & SURGICAL HISTORY:  HTN, HLD, Carpal Tunnel Syndrome, RUE injury with shrapnel s/p removal      FAMILY HISTORY:    [x] Family history not pertinent as reviewed with the patient and family    SOCIAL HISTORY: Drinks a bottle of wine a day, sometimes less. Smokes 1ppd for 60 years. Denies use of illicit drugs     ALLERGIES: No Known Allergies      HOME MEDICATIONS:     CURRENT MEDICATIONS  MEDICATIONS (STANDING):   MEDICATIONS (PRN):  --------------------------------------------------------------------------------------------    Vitals:   T(C): 36.7 (07-09-21 @ 06:33), Max: 36.7 (07-09-21 @ 01:49)  HR: 90 (07-09-21 @ 06:33) (90 - 96)  BP: 134/89 (07-09-21 @ 06:33) (134/89 - 147/90)  RR: 17 (07-09-21 @ 06:33) (17 - 18)  SpO2: 96% (07-09-21 @ 06:33) (95% - 96%)  CAPILLARY BLOOD GLUCOSE        CAPILLARY BLOOD GLUCOSE              PHYSICAL EXAM:   General: NAD  HEENT: Normocephalic, EOMI, Abrasions over left forehead, periorbital region, and lips. Hemostasis noted. No laceration for repair.  Neck: Soft collar in place, midline trachea.  Chest: No chest wall tenderness.   Cardiac: RRR  Respiratory: Unlabored, no conversational dyspnea.   Abdomen: Soft, non-distended, non-tender TTP periumbilically, no rebound, +guarding  Groin: Normal appearing  Ext: Right upper extremity with some contracture as baseline. Left upper extremity with flexion, motor function intact. Hypersensitive from elbow to hand bilaterally. WWP  Back: no TTP, no palpable runoff/stepoff/deformity      --------------------------------------------------------------------------------------------    LABS    PENDING            --------------------------------------------------------------------------------------------    IMAGING  MRI C/T-Spine:  FINDINGS:  Cervical spine:  No abnormal epidural or cord enhancement. C5 and C6 vertebral enhancement corresponds to areas of edema on the noncontrast T2/STIR images and again is likely related to active degenerative changes. No additional abnormal marrow enhancement.    No abnormal paraspinal enhancement.    Thoracic spine:  The thoracic alignment is intact. There are no acute fractures or evidence of traumatic malalignment. The vertebral body heights are maintained. No vertebral body marrow signal abnormality or enhancement.    The thoracic cord is normal in signal intensity and morphology. No abnormal leptomeningeal or cord enhancement. The conus medullaris terminates at the T12/L1 level and is unremarkable. There is no epidural hematoma or collection.    No significant disc herniation, canal stenosis or neural foraminal narrowing.    The visualized mediastinum and lungs are unremarkable. Images submitted for abdomen demonstrate no acute abnormality.    The paraspinal musculature is unremarkable.    IMPRESSION:  C5 and C6 vertebral body marrow enhancement likely related to active endplate degenerative changes.    No thoracic spine fracture, cord compression or cord signal abnormality.    --- End of Report ---      LISA GILMAN MD; Attending Radiologist  This document has been electronically signed. Jul 9 2021 5:51AM      --------------------------------------------------------------------------------------------

## 2021-07-09 NOTE — CONSULT NOTE ADULT - ATTENDING COMMENTS
Pt seen and examined by me   agree with plan  s/p fall  central cord  NSG consulted
NSGY Attg:    see above    patient seen and examined    agree with exam as above  baseline right wrist and hand weakness  new WE and HI weakness on left since fall  + bilateral forearm pain to light tough    CT cervical spine -- no acute fracture  MRI cervical spine -- chronic DDD with stenosis; no cord signal change    agree with plan as above  central cord syndrome; patient wishes for conservative therapy and states he would not want an operation  ICU care  MAPs greater than 85  decadron  collar at all times  will follow

## 2021-07-09 NOTE — H&P ADULT - NSHPLABSRESULTS_GEN_ALL_CORE
Vital Signs Last 24 Hrs  T(C): 36.7 (09 Jul 2021 13:10), Max: 36.7 (09 Jul 2021 01:49)  T(F): 98 (09 Jul 2021 13:10), Max: 98.1 (09 Jul 2021 01:49)  HR: 106 (09 Jul 2021 13:52) (90 - 106)  BP: 120/79 (09 Jul 2021 13:52) (116/83 - 147/90)  BP(mean): 92 (09 Jul 2021 13:52) (92 - 96)  RR: 20 (09 Jul 2021 13:52) (17 - 20)  SpO2: 97% (09 Jul 2021 13:52) (95% - 97%)          LABS:                        14.8   10.22 )-----------( 339      ( 09 Jul 2021 12:40 )             42.2     07-09    133<L>  |  99  |  19.6  ----------------------------<  128<H>  4.1   |  18.0<L>  |  0.80    Ca    9.8      09 Jul 2021 12:40    TPro  7.0  /  Alb  4.6  /  TBili  0.5  /  DBili  x   /  AST  76<H>  /  ALT  27  /  AlkPhos  63  07-09    PT/INR - ( 09 Jul 2021 12:40 )   PT: 10.5 sec;   INR: 0.90 ratio      PTT - ( 09 Jul 2021 12:40 )  PTT:33.6 sec      IMAGING:  MRI C/T-Spine:  FINDINGS:  Cervical spine:  No abnormal epidural or cord enhancement. C5 and C6 vertebral enhancement corresponds to areas of edema on the noncontrast T2/STIR images and again is likely related to active degenerative changes. No additional abnormal marrow enhancement.    No abnormal paraspinal enhancement.    Thoracic spine:  The thoracic alignment is intact. There are no acute fractures or evidence of traumatic malalignment. The vertebral body heights are maintained. No vertebral body marrow signal abnormality or enhancement.    The thoracic cord is normal in signal intensity and morphology. No abnormal leptomeningeal or cord enhancement. The conus medullaris terminates at the T12/L1 level and is unremarkable. There is no epidural hematoma or collection.    No significant disc herniation, canal stenosis or neural foraminal narrowing.    The visualized mediastinum and lungs are unremarkable. Images submitted for abdomen demonstrate no acute abnormality.    The paraspinal musculature is unremarkable.    IMPRESSION:  C5 and C6 vertebral body marrow enhancement likely related to active endplate degenerative changes.    No thoracic spine fracture, cord compression or cord signal abnormality.    --- End of Report ---      LISA GILMAN MD; Attending Radiologist  This document has been electronically signed. Jul 9 2021 5:51AM

## 2021-07-09 NOTE — ED ADULT NURSE NOTE - NS ED NURSE RECORD ANOTHER HT AND WT
Order placed and faxed to [facility]. Pt sent e-advice  
Pt needs regular stress test prior to starting Phase 2 cardiac rehab.   OK to proceed.   
Pt requesting cardiac rehab order to be faxed to [facility].    [facility] rehab contact is Zulma:  P:[phone]  F:[phone]    
Yes

## 2021-07-09 NOTE — ED ADULT NURSE REASSESSMENT NOTE - NS ED NURSE REASSESS COMMENT FT1
Late entry : neurosurgery at the bedside awaiting further orders
Patient returned from MRI. MD aware of patients request for additional pain meds. MD to order an additional dose of morphine 4mg. Awaiting order at this time.
Pt moved to critical care room  requiring neuro checks hourly , blood drawn and COVID swab sent to lab . Pt care endorsed to CCR Christiane OLVERA , all questions answered. care endorsed at this time
received pt into critical care from main ED at this time. Pt with c-collar in place. Trauma at bedside for eval. Pt A&Ox 4 at this time, NAD noted. Pt c/o pain to B/L arms. Will continue to monitor.
Pt received in the stretcher c- collar in place - no distress noted, no chest pain reported awaiting dispo , will continue to monitor

## 2021-07-09 NOTE — H&P ADULT - NSHPSOCIALHISTORY_GEN_ALL_CORE
Drinks a bottle of wine a day, sometimes less. Smokes 1ppd for 60 years. Denies use of illicit drugs

## 2021-07-09 NOTE — PROGRESS NOTE ADULT - SUBJECTIVE AND OBJECTIVE BOX
Kennedy was awake and alert in bed as I fit him with an Aspen Multipost cervical brace, replacing the Phili collar he had on. His daughter Devorah was instructed on cleaning and changing of liners and provided with an additional set. Contact info provided.  [facility]

## 2021-07-09 NOTE — ED PROVIDER NOTE - PROGRESS NOTE DETAILS
Nsx down to evaluate the pt, requesting ICU admission for Q1 neuro checks and strict bp monitoring. - Aroldo Gonsalez, PGY-3

## 2021-07-09 NOTE — ED ADULT NURSE NOTE - OBJECTIVE STATEMENT
pt presents to ED as transfer from [facility] for MRI. pt reports mechanical fall down 3 steps while playing with grandchildren now with b/l hand pain and tingling. pt medicated as ordered. resting comfortably at this time.

## 2021-07-09 NOTE — H&P ADULT - ASSESSMENT
Patient is a 76y old Male w/ HTN and HLD presents as transfer from [facility] for further evaluation of suspected central cord syndrome. Work-up revealed no acute injuries and MRI performed here shows active degenerative disease. Abrasions to left side of face with noted hemostasis. No acute, surgical injuries identified. Neurosurgery evaluated patient and concern for central cord syndrome. Hemodynamically stable.     Possible Central Cord Syndrome  - Admit to SICU under Dr. Pérez  - Neurosurgery Consulted: Recs c-collar on at all times, MAP >85, and q1 neurochecks   - No acute operative injuries identified.   - Fascial abrasions do not require repair, local wound care.  - XR of Bilateral hands without obvious fracture, to follow up final read  - Tertiary exam  - SBIRT, CIWA  - Care per SICU    Patient seen and plan discussed with Dr. Pérez who agrees.

## 2021-07-09 NOTE — CHART NOTE - NSCHARTNOTEFT_GEN_A_CORE
recommend PT/OT eval  ok from neurosurgical perspective for chemical dvt prophylaxis  Keep C-Collar at all times.

## 2021-07-09 NOTE — ED PROVIDER NOTE - ENMT, MLM
Moist mucous membrane. Throat clear. C-collar in place. Moist mucous membrane. Throat clear. C-collar in place. Dried blood to face

## 2021-07-10 LAB
A1C WITH ESTIMATED AVERAGE GLUCOSE RESULT: 5.6 % — SIGNIFICANT CHANGE UP (ref 4–5.6)
ANION GAP SERPL CALC-SCNC: 14 MMOL/L — SIGNIFICANT CHANGE UP (ref 5–17)
ANION GAP SERPL CALC-SCNC: 14 MMOL/L — SIGNIFICANT CHANGE UP (ref 5–17)
BASOPHILS # BLD AUTO: 0.01 K/UL — SIGNIFICANT CHANGE UP (ref 0–0.2)
BASOPHILS NFR BLD AUTO: 0.1 % — SIGNIFICANT CHANGE UP (ref 0–2)
BUN SERPL-MCNC: 42.7 MG/DL — HIGH (ref 8–20)
BUN SERPL-MCNC: 45.3 MG/DL — HIGH (ref 8–20)
CALCIUM SERPL-MCNC: 8.5 MG/DL — LOW (ref 8.6–10.2)
CALCIUM SERPL-MCNC: 8.6 MG/DL — SIGNIFICANT CHANGE UP (ref 8.6–10.2)
CHLORIDE SERPL-SCNC: 100 MMOL/L — SIGNIFICANT CHANGE UP (ref 98–107)
CHLORIDE SERPL-SCNC: 104 MMOL/L — SIGNIFICANT CHANGE UP (ref 98–107)
CO2 SERPL-SCNC: 20 MMOL/L — LOW (ref 22–29)
CO2 SERPL-SCNC: 22 MMOL/L — SIGNIFICANT CHANGE UP (ref 22–29)
COVID-19 SPIKE DOMAIN AB INTERP: POSITIVE
COVID-19 SPIKE DOMAIN ANTIBODY RESULT: >250 U/ML — HIGH
CREAT SERPL-MCNC: 1.35 MG/DL — HIGH (ref 0.5–1.3)
CREAT SERPL-MCNC: 1.62 MG/DL — HIGH (ref 0.5–1.3)
EOSINOPHIL # BLD AUTO: 0 K/UL — SIGNIFICANT CHANGE UP (ref 0–0.5)
EOSINOPHIL NFR BLD AUTO: 0 % — SIGNIFICANT CHANGE UP (ref 0–6)
ESTIMATED AVERAGE GLUCOSE: 114 MG/DL — SIGNIFICANT CHANGE UP (ref 68–114)
GLUCOSE BLDC GLUCOMTR-MCNC: 140 MG/DL — HIGH (ref 70–99)
GLUCOSE SERPL-MCNC: 128 MG/DL — HIGH (ref 70–99)
GLUCOSE SERPL-MCNC: 137 MG/DL — HIGH (ref 70–99)
HCT VFR BLD CALC: 37.1 % — LOW (ref 39–50)
HCV AB S/CO SERPL IA: 0.05 S/CO — SIGNIFICANT CHANGE UP (ref 0–0.99)
HCV AB SERPL-IMP: SIGNIFICANT CHANGE UP
HGB BLD-MCNC: 12.4 G/DL — LOW (ref 13–17)
IMM GRANULOCYTES NFR BLD AUTO: 0.4 % — SIGNIFICANT CHANGE UP (ref 0–1.5)
LYMPHOCYTES # BLD AUTO: 0.21 K/UL — LOW (ref 1–3.3)
LYMPHOCYTES # BLD AUTO: 2.1 % — LOW (ref 13–44)
MAGNESIUM SERPL-MCNC: 2.1 MG/DL — SIGNIFICANT CHANGE UP (ref 1.6–2.6)
MCHC RBC-ENTMCNC: 31.6 PG — SIGNIFICANT CHANGE UP (ref 27–34)
MCHC RBC-ENTMCNC: 33.4 GM/DL — SIGNIFICANT CHANGE UP (ref 32–36)
MCV RBC AUTO: 94.6 FL — SIGNIFICANT CHANGE UP (ref 80–100)
MONOCYTES # BLD AUTO: 0.22 K/UL — SIGNIFICANT CHANGE UP (ref 0–0.9)
MONOCYTES NFR BLD AUTO: 2.2 % — SIGNIFICANT CHANGE UP (ref 2–14)
NEUTROPHILS # BLD AUTO: 9.71 K/UL — HIGH (ref 1.8–7.4)
NEUTROPHILS NFR BLD AUTO: 95.2 % — HIGH (ref 43–77)
PHOSPHATE SERPL-MCNC: 4.9 MG/DL — HIGH (ref 2.4–4.7)
PLATELET # BLD AUTO: 297 K/UL — SIGNIFICANT CHANGE UP (ref 150–400)
POTASSIUM SERPL-MCNC: 4.2 MMOL/L — SIGNIFICANT CHANGE UP (ref 3.5–5.3)
POTASSIUM SERPL-MCNC: 4.3 MMOL/L — SIGNIFICANT CHANGE UP (ref 3.5–5.3)
POTASSIUM SERPL-SCNC: 4.2 MMOL/L — SIGNIFICANT CHANGE UP (ref 3.5–5.3)
POTASSIUM SERPL-SCNC: 4.3 MMOL/L — SIGNIFICANT CHANGE UP (ref 3.5–5.3)
RBC # BLD: 3.92 M/UL — LOW (ref 4.2–5.8)
RBC # FLD: 12.6 % — SIGNIFICANT CHANGE UP (ref 10.3–14.5)
SARS-COV-2 IGG+IGM SERPL QL IA: >250 U/ML — HIGH
SARS-COV-2 IGG+IGM SERPL QL IA: POSITIVE
SODIUM SERPL-SCNC: 136 MMOL/L — SIGNIFICANT CHANGE UP (ref 135–145)
SODIUM SERPL-SCNC: 138 MMOL/L — SIGNIFICANT CHANGE UP (ref 135–145)
WBC # BLD: 10.19 K/UL — SIGNIFICANT CHANGE UP (ref 3.8–10.5)
WBC # FLD AUTO: 10.19 K/UL — SIGNIFICANT CHANGE UP (ref 3.8–10.5)

## 2021-07-10 PROCEDURE — 99232 SBSQ HOSP IP/OBS MODERATE 35: CPT

## 2021-07-10 PROCEDURE — 99291 CRITICAL CARE FIRST HOUR: CPT

## 2021-07-10 RX ORDER — SODIUM CHLORIDE 9 MG/ML
1000 INJECTION, SOLUTION INTRAVENOUS
Refills: 0 | Status: DISCONTINUED | OUTPATIENT
Start: 2021-07-10 | End: 2021-07-10

## 2021-07-10 RX ORDER — AMLODIPINE BESYLATE 2.5 MG/1
1 TABLET ORAL
Qty: 0 | Refills: 0 | DISCHARGE

## 2021-07-10 RX ORDER — LIDOCAINE 4 G/100G
1 CREAM TOPICAL DAILY
Refills: 0 | Status: DISCONTINUED | OUTPATIENT
Start: 2021-07-10 | End: 2021-07-12

## 2021-07-10 RX ORDER — LOSARTAN POTASSIUM 100 MG/1
1 TABLET, FILM COATED ORAL
Qty: 0 | Refills: 0 | DISCHARGE

## 2021-07-10 RX ORDER — ACETAMINOPHEN 500 MG
1000 TABLET ORAL ONCE
Refills: 0 | Status: COMPLETED | OUTPATIENT
Start: 2021-07-10 | End: 2021-07-10

## 2021-07-10 RX ORDER — ATENOLOL 25 MG/1
1 TABLET ORAL
Qty: 0 | Refills: 0 | DISCHARGE

## 2021-07-10 RX ORDER — EZETIMIBE 10 MG/1
1 TABLET ORAL
Qty: 0 | Refills: 0 | DISCHARGE

## 2021-07-10 RX ORDER — ATORVASTATIN CALCIUM 80 MG/1
40 TABLET, FILM COATED ORAL AT BEDTIME
Refills: 0 | Status: DISCONTINUED | OUTPATIENT
Start: 2021-07-10 | End: 2021-07-12

## 2021-07-10 RX ORDER — LANOLIN ALCOHOL/MO/W.PET/CERES
5 CREAM (GRAM) TOPICAL AT BEDTIME
Refills: 0 | Status: DISCONTINUED | OUTPATIENT
Start: 2021-07-10 | End: 2021-07-12

## 2021-07-10 RX ORDER — SODIUM CHLORIDE 9 MG/ML
1000 INJECTION INTRAMUSCULAR; INTRAVENOUS; SUBCUTANEOUS
Refills: 0 | Status: DISCONTINUED | OUTPATIENT
Start: 2021-07-10 | End: 2021-07-11

## 2021-07-10 RX ADMIN — GABAPENTIN 300 MILLIGRAM(S): 400 CAPSULE ORAL at 06:21

## 2021-07-10 RX ADMIN — Medication 4 MILLIGRAM(S): at 06:19

## 2021-07-10 RX ADMIN — ATORVASTATIN CALCIUM 40 MILLIGRAM(S): 80 TABLET, FILM COATED ORAL at 21:53

## 2021-07-10 RX ADMIN — GABAPENTIN 300 MILLIGRAM(S): 400 CAPSULE ORAL at 13:32

## 2021-07-10 RX ADMIN — LIDOCAINE 1 PATCH: 4 CREAM TOPICAL at 20:12

## 2021-07-10 RX ADMIN — TRAMADOL HYDROCHLORIDE 50 MILLIGRAM(S): 50 TABLET ORAL at 00:06

## 2021-07-10 RX ADMIN — LIDOCAINE 1 PATCH: 4 CREAM TOPICAL at 11:20

## 2021-07-10 RX ADMIN — LIDOCAINE 1 PATCH: 4 CREAM TOPICAL at 20:11

## 2021-07-10 RX ADMIN — Medication 4 MILLIGRAM(S): at 17:18

## 2021-07-10 RX ADMIN — Medication 650 MILLIGRAM(S): at 11:52

## 2021-07-10 RX ADMIN — ENOXAPARIN SODIUM 30 MILLIGRAM(S): 100 INJECTION SUBCUTANEOUS at 06:24

## 2021-07-10 RX ADMIN — PANTOPRAZOLE SODIUM 40 MILLIGRAM(S): 20 TABLET, DELAYED RELEASE ORAL at 06:24

## 2021-07-10 RX ADMIN — TRAMADOL HYDROCHLORIDE 50 MILLIGRAM(S): 50 TABLET ORAL at 01:00

## 2021-07-10 RX ADMIN — SODIUM CHLORIDE 125 MILLILITER(S): 9 INJECTION INTRAMUSCULAR; INTRAVENOUS; SUBCUTANEOUS at 06:24

## 2021-07-10 RX ADMIN — ENOXAPARIN SODIUM 30 MILLIGRAM(S): 100 INJECTION SUBCUTANEOUS at 17:18

## 2021-07-10 RX ADMIN — GABAPENTIN 300 MILLIGRAM(S): 400 CAPSULE ORAL at 21:53

## 2021-07-10 RX ADMIN — LIDOCAINE 1 PATCH: 4 CREAM TOPICAL at 11:22

## 2021-07-10 RX ADMIN — Medication 5 MILLIGRAM(S): at 21:53

## 2021-07-10 RX ADMIN — Medication 4 MILLIGRAM(S): at 23:27

## 2021-07-10 RX ADMIN — Medication 650 MILLIGRAM(S): at 11:22

## 2021-07-10 RX ADMIN — Medication 4 MILLIGRAM(S): at 11:20

## 2021-07-10 NOTE — PROGRESS NOTE ADULT - SUBJECTIVE AND OBJECTIVE BOX
HPI/OVERNIGHT EVENTS: Patient seen and examined at bedside this AM. No overnight events. No complaints. Denies fever, chills, nausea, vomiting, chest pain, SOB, dizziness, abd pain or any other concerning symptoms.    Vital Signs Last 24 Hrs  T(C): 36.7 (10 Jul 2021 04:19), Max: 36.9 (09 Jul 2021 20:20)  T(F): 98 (10 Jul 2021 04:19), Max: 98.4 (09 Jul 2021 20:20)  HR: 64 (10 Jul 2021 07:00) (54 - 106)  BP: 115/77 (10 Jul 2021 07:00) (106/80 - 135/83)  BP(mean): 89 (10 Jul 2021 07:00) (87 - 103)  RR: 15 (10 Jul 2021 07:00) (12 - 21)  SpO2: 94% (10 Jul 2021 07:00) (92% - 97%)    I&O's Detail    09 Jul 2021 07:01  -  10 Jul 2021 07:00  --------------------------------------------------------  IN:    Oral Fluid: 440 mL    sodium chloride 0.9%: 250 mL    sodium chloride 0.9%: 1100 mL  Total IN: 1790 mL    OUT:    Voided (mL): 600 mL  Total OUT: 600 mL    Total NET: 1190 mL          Constitutional: patient resting comfortably in bed, in no acute distress  HEENT: EOMI, PERRLA, MMM.  Respiratory: Non labored breathing on RA  Cardiovascular: RRR  Gastrointestinal: Abdomen soft, non-tender, non-distended, no rebound tenderness / guarding  Musculoskeletal: No joint pain, swelling or deformity; no limitation of movement  Vascular: Extremities warm and well perfused.     LABS:                        12.4   10.19 )-----------( 297      ( 10 Jul 2021 04:48 )             37.1     07-10    136  |  100  |  42.7<H>  ----------------------------<  137<H>  4.3   |  22.0  |  1.62<H>    Ca    8.6      10 Jul 2021 04:48  Phos  4.9     07-10  Mg     2.1     07-10    TPro  7.0  /  Alb  4.6  /  TBili  0.5  /  DBili  x   /  AST  76<H>  /  ALT  27  /  AlkPhos  63  07-09    PT/INR - ( 09 Jul 2021 12:40 )   PT: 10.5 sec;   INR: 0.90 ratio         PTT - ( 09 Jul 2021 12:40 )  PTT:33.6 sec      MEDICATIONS  (STANDING):  atorvastatin 40 milliGRAM(s) Oral at bedtime  dexAMETHasone  Injectable 4 milliGRAM(s) IV Push every 6 hours  enoxaparin Injectable 30 milliGRAM(s) SubCutaneous two times a day  gabapentin 300 milliGRAM(s) Oral every 8 hours  insulin lispro (ADMELOG) corrective regimen sliding scale   SubCutaneous three times a day before meals  lidocaine   Patch 1 Patch Transdermal daily  lidocaine   Patch 1 Patch Transdermal daily  melatonin 5 milliGRAM(s) Oral at bedtime  nicotine - 21 mG/24Hr(s) Patch 1 patch Transdermal daily  pantoprazole    Tablet 40 milliGRAM(s) Oral before breakfast  sodium chloride 0.9%. 1000 milliLiter(s) (125 mL/Hr) IV Continuous <Continuous>    MEDICATIONS  (PRN):  acetaminophen   Tablet .. 650 milliGRAM(s) Oral every 6 hours PRN Mild Pain (1 - 3)      MICRO:   Cultures     STUDIES:   EKG, CXR, U/S, CT, MRI

## 2021-07-10 NOTE — CHART NOTE - NSCHARTNOTEFT_GEN_A_CORE
Tertiary Trauma Survey (TTS)    Date of TTS: 07-10-21 @ 17:31                             Admit Date: 07-09-21 @ 12:06      Trauma Activation: Transfer from [facility], Trauma B activated, mechanical fall    Subjective / 24 hour events: Pt reports pain is improving significantly. States that pain is now only in hands and fingers whereas yesterday he stated that he had severe pain on b/l forearms from the elbow to fingertips. Yesterday, patient was admited to the ICU for central cord syndrome. Q1 hour neurochecks have been notable for improving mobility in b/l UE and improvement in pain.     Vital Signs Last 24 Hrs  T(C): 36.9 (10 Jul 2021 15:39), Max: 36.9 (09 Jul 2021 20:20)  T(F): 98.5 (10 Jul 2021 15:39), Max: 98.5 (10 Jul 2021 15:39)  HR: 92 (10 Jul 2021 17:00) (54 - 92)  BP: 136/79 (10 Jul 2021 17:00) (106/80 - 141/91)  BP(mean): 98 (10 Jul 2021 17:00) (85 - 107)  RR: 21 (10 Jul 2021 17:00) (12 - 21)  SpO2: 94% (10 Jul 2021 17:00) (92% - 95%)    Physical Exam:    Neuro: [ ] non focal neurological exam [x] Focal Neurological deficits noted to be: decreased b/l upper extremity strength (per pt report, 2/2 pain) in b/l hands and fingertips (R thumb and forefinger deficit due to a prior injury).    HEENT: [ ] Normo-cephalic/atraumatic  [x] abnormalities noted to be: superficial abrasions to the L side of the face (medial cheek, upper lip, forehead)    Pulm/Chest:  [ ] CTA b/l  [x] chest wall non tender  [ ] abnormalities noted to be:    Cardiac: [ ] S1S2, sinus rhythm  [x] abnormalities noted to be: EKG w/ Left bundle branch block (baseline - dx 8 years ago)    GI / Abdomen: [x] Soft, non-tender, non-distended [ ] abnormalities noted to be:    Musculoskeletal / Extremities: [ ]normal active ROM  [x]  abnormalities noted to be: limited ROM in all fingers due to pain, no ROM in R thumb and first finger due to old traumatic injury    Integumentary: [x] Skin intact [ ] Warm [ ] Dry [ ]abnormalities noted to be: abrasions as noted in HEENT    Vascular: [x] 2+ palpable distal pulses  [ ] MARAL:       [ ] abnormalities noted to be:    List Injuries Identified to Date: Incomplete cervical spinal injury, Central cord syndrome    List Operative and Interventional Radiological Procedures:   MRI C-Spine: trace prevertebral edema from C4-C6, some questionable buckling of the anterior longitudinal ligament, edema in C5 and C6 may be chronic, cannot exclude contusion from trauma, misalignment of the spinous process of C5 felt to be chronic given the lack of edema in the adjacent interspinous fat.    Consults (Date):  [ x ] Neurosurgery 7/9  [  ] Orthopedics  [  ] Plastics  [  ] Urology  [  ] PM&R    Findings:  Tertiary survey preformed, no new injuries noted. Improvement in sensory and motor function noted in b/l upper extremities. Pt reports decreased pain in b/l forearms and wrists. Still reports pain in hands and fingers, though less severe than yesterday.     Plan:   - c collar at all times  - continue q1 hour neuro checks  - pain control with gabapentin, lidocaine patches and Tylenol  - Continue steroids for cervical edema

## 2021-07-10 NOTE — PROGRESS NOTE ADULT - SUBJECTIVE AND OBJECTIVE BOX
Neurosurgery       HPI:   Patient is a 76y old male PMH HTN, HLD who with prior injury with residual Right hand weakness presents S/P  transferred from [facility] [place] to face. Patient was babysitting his grandchildren when he fell forward down 3 concrete steps, landing on his face and hands. Did not lose consciousness. Was able to ambulate afterwards. Complaining of pain at both hands with hypersensitivity from elbow to hand bilaterally. Tripped  down 3 steps, fell onto left face onto stone, denies LOC. Admits to drinking wine. He is noted to have dysesthetic pain of Bilateral hands c/w Central Cord syndrome . Work up was consistent pre vertebral edema at C 4/5 C 5/6 , there is Disc osteophyte complex C 5/6 C 6/7 with cord compression and signal changes in the cord .       7/10 seen today sitting up in bed complains of pain of both wrists and hands , he has lidocaine patch on both wrists . He continues to have dysesthetic pain and unable to tolerate neuro exam . He was placed with elevated pillows bilaterally       MEDICATIONS  (STANDING):  atorvastatin 40 milliGRAM(s) Oral at bedtime  dexAMETHasone  Injectable 4 milliGRAM(s) IV Push every 6 hours  enoxaparin Injectable 30 milliGRAM(s) SubCutaneous two times a day  gabapentin 300 milliGRAM(s) Oral every 8 hours  insulin lispro (ADMELOG) corrective regimen sliding scale   SubCutaneous three times a day before meals  lidocaine   Patch 1 Patch Transdermal daily  lidocaine   Patch 1 Patch Transdermal daily  melatonin 5 milliGRAM(s) Oral at bedtime  nicotine - 21 mG/24Hr(s) Patch 1 patch Transdermal daily  pantoprazole    Tablet 40 milliGRAM(s) Oral before breakfast  sodium chloride 0.9%. 1000 milliLiter(s) (125 mL/Hr) IV Continuous <Continuous>    MEDICATIONS  (PRN):  acetaminophen   Tablet .. 650 milliGRAM(s) Oral every 6 hours PRN Mild Pain (1 - 3)      Allergies    No Known Allergies      Vital Signs Last 24 Hrs  T(C): 36.8 (10 Jul 2021 12:08), Max: 36.9 (09 Jul 2021 20:20)  T(F): 98.2 (10 Jul 2021 12:08), Max: 98.4 (09 Jul 2021 20:20)  HR: 77 (10 Jul 2021 15:00) (54 - 96)  BP: 141/91 (10 Jul 2021 15:00) (106/80 - 141/91)  BP(mean): 107 (10 Jul 2021 15:00) (85 - 107)  RR: 17 (10 Jul 2021 15:00) (12 - 20)  SpO2: 93% (10 Jul 2021 15:00) (92% - 95%)    PHYSICAL EXAM:    GENERAL: NAD,   HEAD:  Atraumatic, Normocephalic, facial laceration   EYES: EOMI, PERRLA, conjunctiva and sclera clear  Neuro :  Awake  alert oriented to self, place situation   Fund of knowledge, recent and remote memory are intact   Mood; normal affect   CN II-XII intact PERRL , EOMI,   Face symmetrical tongue is midline speech is clear and fluent  Motor:       MEDICATIONS  (STANDING):  atorvastatin 40 milliGRAM(s) Oral at bedtime  dexAMETHasone  Injectable 4 milliGRAM(s) IV Push every 6 hours  enoxaparin Injectable 30 milliGRAM(s) SubCutaneous two times a day  gabapentin 300 milliGRAM(s) Oral every 8 hours  insulin lispro (ADMELOG) corrective regimen sliding scale   SubCutaneous three times a day before meals  lidocaine   Patch 1 Patch Transdermal daily  lidocaine   Patch 1 Patch Transdermal daily  melatonin 5 milliGRAM(s) Oral at bedtime  nicotine - 21 mG/24Hr(s) Patch 1 patch Transdermal daily  pantoprazole    Tablet 40 milliGRAM(s) Oral before breakfast  sodium chloride 0.9%. 1000 milliLiter(s) (125 mL/Hr) IV Continuous <Continuous>      Vital Signs Last 24 Hrs  T(C): 36.9 (10 Jul 2021 15:39), Max: 36.9 (09 Jul 2021 20:20)  T(F): 98.5 (10 Jul 2021 15:39), Max: 98.5 (10 Jul 2021 15:39)  HR: 77 (10 Jul 2021 15:00) (54 - 96)  BP: 141/91 (10 Jul 2021 15:00) (106/80 - 141/91)  BP(mean): 107 (10 Jul 2021 15:00) (85 - 107)  RR: 17 (10 Jul 2021 15:00) (12 - 20)  SpO2: 93% (10 Jul 2021 15:00) (92% - 95%)    PHYSICAL EXAM:  Neuro:  Cervical Collar Aspen in place     Mental status:   The patient is alert, attentive, and oriented to self, place and situation   Speech is clear and fluent   Fund of knowledge, recent and remote memory are intact Good comprehension  Mood:  normal affect  Cranial nerves:  CN II: Visual fields are full to confrontation.   CN III, IV, and VI: At primary gaze,   CN V: Facial sensation is intact to light touch in all 3 divisions bilaterally.  CN VII: Face is symmetric with normal eye closure and smile.  CN VII: Hearing is normal to rubbing fingers  CN IX, X: Palate elevates symmetrically. Phonation is normal.  CN XI: Head turning and shoulder shrug are intact  CN XII: Tongue is midline with normal movements and no atrophy.  Motor: Limited by hand pain   Normal Muscle bulk and tone   Strength is full bilaterally. No Noted pill rolling, No Tremor   3/5 bilaterally , baseline Right hand weakness and atrophy noted   Deltoids & Biceps, Triceps 5/5 bilaterally   Wrist extension / flex 2/5 bilaterally  Index to Thumb apposition 4/5 bilaterally,   Pinky to thumb apposition 4/5 bilaterally  LE:   Iliopsoas, Hamstrings & Quads 5/5 bilaterally   Anterior Tibialis & Gastrocnemius 5/5 bilaterally  Sensory: intact to light touch, Hyperesthesia     Labs:                         12.4   10.19 )-----------( 297      ( 10 Jul 2021 04:48 )             37.1    07-10    138  |  104  |  45.3<H>  ----------------------------<  128<H>  4.2   |  20.0<L>  |  1.35<H>    Ca    8.5<L>      10 Jul 2021 11:30  Phos  4.9     07-10  Mg     2.1     07-10    TPro  7.0  /  Alb  4.6  /  TBili  0.5  /  DBili  x   /  AST  76<H>  /  ALT  27  /  AlkPhos  63  07-09      PT/INR - ( 09 Jul 2021 12:40 )   PT: 10.5 sec;   INR: 0.90 ratio         PTT - ( 09 Jul 2021 12:40 )  PTT:33.6 sec     EXAM:  MR SPINE CERVICAL IC                         EXAM:  MR SPINE THORACIC WAW IC                          PROCEDURE DATE:  07/09/2021        INTERPRETATION:  CLINICAL HISTORY: Trauma. Status post fall with upper extremity hyperesthesia. Evaluate for cord injury.    TECHNIQUE: MRI of the cervical and thoracic spine with IV contrast.  Limited postcontrast images of the cervical spine included sagittal and T1 fat saturated images.  Thoracic spine consists of sagittal and transaxial T1 and sagittal STIR images precontrast. Postcontrast sagittal and transaxial T1 fat satted images are provided.  7 mls of Gadavist gadolinium-based contrast was administered intravenously without untoward event. 0.5 mls discarded.    COMPARISON: CT cervical spine from 7/8/2021.    FINDINGS:    Cervical spine:  No abnormal epidural or cord enhancement. C5 and C6 vertebral enhancement corresponds to areas of edema on the noncontrast T2/STIR images and again is likely related to active degenerative changes. No additional abnormal marrow enhancement.    No abnormal paraspinal enhancement.    Thoracic spine:  The thoracic alignment is intact. There are no acute fractures or evidence of traumatic malalignment. The vertebral body heights are maintained. No vertebral body marrow signal abnormality or enhancement.    The thoracic cord is normal in signal intensity and morphology. No abnormal leptomeningeal or cord enhancement. The conus medullaris terminates at the T12/L1 level and is unremarkable. There is no epidural hematoma or collection.    No significant disc herniation, canal stenosis or neural foraminal narrowing.    The visualized mediastinum and lungs are unremarkable. Images submitted for abdomen demonstrate no acute abnormality.    The paraspinal musculature is unremarkable.    IMPRESSION:  C5 and C6 vertebral body marrow enhancement likely related to active endplate degenerative changes.    No thoracic spine fracture, cord compression or cord signal abnormality.    --- End of Report ---        A/P : 76 year old male with HTN, HLP Cervical Stenosis , CTS, who had a mechanical fall face first fell sustained Central Cord syndrome , now with improving neuro exam continues to have Bilateral  weakness and Dysesthetic pain . Work up No Cervical fractures identified on CT of the C spine Follow up MRI of the C spine revealed C 5/6 C 6/7 Disc osteophyte complex with cord compression resulting in signal changes in the Cord , also noted to have Edema in the C 5 and C 6 vertebral bodies c/w  degenerative changes .Stable     Continue Decadron IV For Cord edema   Protonix for GI prophylaxis   Insulin coverage while on Steroids   PT evaluate and treat   Patient has requested to be discharged home with Home care and does not want Rehab placement it was discussed that he will need to be medically stable and evaluated by PT prior to discharge decisions   Baseline Right hand weakness   Patient has declined surgical intervention   d/w Dr Marroquin

## 2021-07-10 NOTE — PROGRESS NOTE ADULT - ASSESSMENT
Patient is a 76y old Male w/ HTN and HLD presents as transfer from [facility] for further evaluation of suspected central cord syndrome. Work-up revealed no acute injuries and MRI performed here shows active degenerative disease. Abrasions to left side of face with noted hemostasis. No acute, surgical injuries identified.     #S/p Fall Down Steps  - No acute operative injuries identified.   - Fascial abrasions do not require repair, local wound care.  - XR of Bilateral hands without obvious fracture, to follow up final read  -Neuro check q1   -MAP > 85

## 2021-07-10 NOTE — PROGRESS NOTE ADULT - SUBJECTIVE AND OBJECTIVE BOX
HISTORY  76y Male w/ PMH HTN, [place]LD [place] [facility] [place] severe paresthesia, and severe hyperalgesia in b/l forearms and hands. Admitted to ICU w/ spinal cord syndrome for close neurologic monitoring.    24 HOUR EVENTS:  - [place] [facility] [place] revealed c/f central cord syndrome  - admitted to SICU for q1hr neurochecks  - Fitted for Aspen collar  - pain continued to be issue. 1x tramadol 50mg given, lidocaine patches ordered for b/l arms  - melatonin ordered for insomnia    SUBJECTIVE/ROS:  Patient states pain is minimally improved. States pain had been improving some in early evening until he attempted to remove his dentures. At which point his pain was provoked again. Endorses getting minimal rest overnight 2/2 pain.    [ ] A ten-point review of systems was otherwise negative except as noted.  [ ] Due to altered mental status/intubation, subjective information were not able to be obtained from the patient. History was obtained, to the extent possible, from review of the chart and collateral sources of information.      NEURO  RASS: 0    GCS: 15    CAM ICU: neg  Exam: AOx3, NAD, ROM in BUE limited 2/2 severe pain.   Meds: acetaminophen   Tablet .. 650 milliGRAM(s) Oral every 6 hours PRN Mild Pain (1 - 3)  gabapentin 300 milliGRAM(s) Oral every 8 hours  melatonin 5 milliGRAM(s) Oral at bedtime    [x] Adequacy of sedation and pain control has been assessed and adjusted      RESPIRATORY  RR: 16 (07-10-21 @ 01:00) (15 - 21)  SpO2: 95% (07-10-21 @ 01:00) (92% - 97%)  Wt(kg): --  Exam: unlabored, clear to auscultation bilaterally  Mechanical Ventilation:   ABG - ( 09 Jul 2021 15:11 )  pH: x     /  pCO2: x     /  pO2: x     / HCO3: x     / Base Excess: x     /  SaO2: x       Lactate: 1.1              [ ] Extubation Readiness Assessed  Meds:       CARDIOVASCULAR  HR: 66 (07-10-21 @ 01:00) (63 - 106)  BP: 129/85 (07-10-21 @ 01:00) (106/80 - 135/83)  BP(mean): 99 (07-10-21 @ 01:00) (88 - 103)  ABP: --  ABP(mean): --  Wt(kg): --  CVP(cm H2O): --    Lactate, Blood: 1.1 mmol/L (07-09 @ 15:11)    Exam: RRR, normal S1/S2  Cardiac Rhythm: SB/NSR  Perfusion     [x ]Adequate   [ ]Inadequate  Mentation   [ x]Normal       [ ]Reduced  Extremities  [x ]Warm         [ ]Cool  Volume Status [ ]Hypervolemic [x ]Euvolemic [ ]Hypovolemic  Meds:       GI/NUTRITION  Exam: Soft, non-tender, non-distended  Diet, Regular:   Low Fat (LOWFAT) (07-09-21 @ 16:38) [Active]  Meds: pantoprazole    Tablet 40 milliGRAM(s) Oral before breakfast      GENITOURINARY  I&O's Detail    07-09 @ 07:01  -  07-10 @ 03:36  --------------------------------------------------------  IN:    sodium chloride 0.9%: 400 mL  Total IN: 400 mL    OUT:    Voided (mL): 400 mL  Total OUT: 400 mL    Total NET: 0 mL        Weight (kg): 68.9 (07-09 @ 13:52)  07-09    133<L>  |  99  |  19.6  ----------------------------<  128<H>  4.1   |  18.0<L>  |  0.80    Ca    9.8      09 Jul 2021 12:40    TPro  7.0  /  Alb  4.6  /  TBili  0.5  /  DBili  x   /  AST  76<H>  /  ALT  27  /  AlkPhos  63  07-09    [ ] Conrad catheter, indication: N/A  Meds: sodium chloride 0.9%. 1000 milliLiter(s) IV Continuous <Continuous>        HEMATOLOGIC  Meds: enoxaparin Injectable 30 milliGRAM(s) SubCutaneous two times a day    [x] VTE Prophylaxis                        14.8   10.22 )-----------( 339      ( 09 Jul 2021 12:40 )             42.2     PT/INR - ( 09 Jul 2021 12:40 )   PT: 10.5 sec;   INR: 0.90 ratio         PTT - ( 09 Jul 2021 12:40 )  PTT:33.6 sec  Transfusion     [ ] PRBC   [ ] Platelets   [ ] FFP   [ ] Cryoprecipitate      INFECTIOUS DISEASES  T(C): 36.8 (07-09-21 @ 23:24), Max: 36.9 (07-09-21 @ 20:20)  Wt(kg): --  WBC Count: 10.22 K/uL (07-09 @ 12:40)    Recent Cultures:    Meds:       ENDOCRINE  Capillary Blood Glucose    Meds: dexAMETHasone  Injectable 4 milliGRAM(s) IV Push every 6 hours  insulin lispro (ADMELOG) corrective regimen sliding scale   SubCutaneous three times a day before meals        ACCESS DEVICES:  [x ] Peripheral IV  [ ] Central Venous Line	[ ] R	[ ] L	[ ] IJ	[ ] Fem	[ ] SC	Placed:   [ ] Arterial Line		[ ] R	[ ] L	[ ] Fem	[ ] Rad	[ ] Ax	Placed:   [ ] PICC:					[ ] Mediport  [ ] Urinary Catheter, Date Placed:   [ ] Necessity of urinary, arterial, and venous catheters discussed    OTHER MEDICATIONS:  lidocaine   Patch 1 Patch Transdermal daily  lidocaine   Patch 1 Patch Transdermal daily  nicotine - 21 mG/24Hr(s) Patch 1 patch Transdermal daily      CODE STATUS: Full Code HISTORY  76y Male w/ PMH HTN, HLD [place] [facility] [place] with severe paresthesia, and severe hyperalgesia in b/l forearms and hands. Admitted to ICU w/ spinal cord syndrome for close neurologic monitoring.    24 HOUR EVENTS:  - [place] [facility] [place]  - MRI revealed c/f central cord syndrome  - admitted to SICU for q1hr neurochecks  - Fitted for Aspen collar  - pain continued to be issue. 1x tramadol 50mg given, lidocaine patches ordered for b/l arms  - melatonin ordered for insomnia    SUBJECTIVE/ROS:  Patient states pain is minimally improved. States pain had been improving some in early evening until he attempted to remove his dentures. At which point his pain was provoked again. Endorses getting minimal rest overnight 2/2 pain. Patient reports history of LBBB, for which he has a Cardiologist who has been monitoring without active/ongoing treatment.    [ ] A ten-point review of systems was otherwise negative except as noted.  [ ] Due to altered mental status/intubation, subjective information were not able to be obtained from the patient. History was obtained, to the extent possible, from review of the chart and collateral sources of information.      NEURO  RASS: 0    GCS: 15    CAM ICU: neg  Exam: AOx3, NAD, ROM in BUE limited 2/2 severe pain.   Meds: acetaminophen   Tablet .. 650 milliGRAM(s) Oral every 6 hours PRN Mild Pain (1 - 3)  gabapentin 300 milliGRAM(s) Oral every 8 hours  melatonin 5 milliGRAM(s) Oral at bedtime    [x] Adequacy of sedation and pain control has been assessed and adjusted      RESPIRATORY  RR: 16 (07-10-21 @ 01:00) (15 - 21)  SpO2: 95% (07-10-21 @ 01:00) (92% - 97%)  Wt(kg): --  Exam: unlabored, clear to auscultation bilaterally  Mechanical Ventilation:   ABG - ( 09 Jul 2021 15:11 )  pH: x     /  pCO2: x     /  pO2: x     / HCO3: x     / Base Excess: x     /  SaO2: x       Lactate: 1.1      [ ] Extubation Readiness Assessed  Meds:       CARDIOVASCULAR  HR: 66 (07-10-21 @ 01:00) (63 - 106)  BP: 129/85 (07-10-21 @ 01:00) (106/80 - 135/83)  BP(mean): 99 (07-10-21 @ 01:00) (88 - 103)  ABP: --  ABP(mean): --  Wt(kg): --  CVP(cm H2O): --    Lactate, Blood: 1.1 mmol/L (07-09 @ 15:11)    Exam: RRR, normal S1/S2  Cardiac Rhythm: SB/NSR  Perfusion     [x ]Adequate   [ ]Inadequate  Mentation   [ x]Normal       [ ]Reduced  Extremities  [x ]Warm         [ ]Cool  Volume Status [ ]Hypervolemic [x ]Euvolemic [ ]Hypovolemic  Meds:       GI/NUTRITION  Exam: Soft, non-tender, non-distended  Diet, Regular:   Low Fat (LOWFAT) (07-09-21 @ 16:38) [Active]  Meds: pantoprazole    Tablet 40 milliGRAM(s) Oral before breakfast      GENITOURINARY  I&O's Detail    07-09 @ 07:01  -  07-10 @ 03:36  --------------------------------------------------------  IN:    sodium chloride 0.9%: 400 mL  Total IN: 400 mL    OUT:    Voided (mL): 400 mL  Total OUT: 400 mL    Total NET: 0 mL        Weight (kg): 68.9 (07-09 @ 13:52)      07-10    136  |  100  |  42.7<H>  ----------------------------<  137<H>  4.3   |  22.0  |  1.62<H>    Ca    8.6      10 Jul 2021 04:48  Phos  4.9     07-10  Mg     2.1     07-10    TPro  7.0  /  Alb  4.6  /  TBili  0.5  /  DBili  x   /  AST  76<H>  /  ALT  27  /  AlkPhos  63  07-09      [ ] Conrad catheter, indication: N/A  Meds: sodium chloride 0.9%. 1000 milliLiter(s) IV Continuous <Continuous>        HEMATOLOGIC  Meds: enoxaparin Injectable 30 milliGRAM(s) SubCutaneous two times a day    [x] VTE Prophylaxis                        12.4   10.19 )-----------( 297      ( 10 Jul 2021 04:48 )             37.1     Transfusion     [ ] PRBC   [ ] Platelets   [ ] FFP   [ ] Cryoprecipitate      INFECTIOUS DISEASES  T(C): 36.8 (07-09-21 @ 23:24), Max: 36.9 (07-09-21 @ 20:20)  Wt(kg): --  WBC Count: 10.22 K/uL (07-09 @ 12:40)    Recent Cultures:    Meds:       ENDOCRINE  Capillary Blood Glucose    Meds: dexAMETHasone  Injectable 4 milliGRAM(s) IV Push every 6 hours  insulin lispro (ADMELOG) corrective regimen sliding scale   SubCutaneous three times a day before meals        ACCESS DEVICES:  [x ] Peripheral IV  [ ] Central Venous Line	[ ] R	[ ] L	[ ] IJ	[ ] Fem	[ ] SC	Placed:   [ ] Arterial Line		[ ] R	[ ] L	[ ] Fem	[ ] Rad	[ ] Ax	Placed:   [ ] PICC:					[ ] Mediport  [ ] Urinary Catheter, Date Placed:   [ ] Necessity of urinary, arterial, and venous catheters discussed    OTHER MEDICATIONS:  lidocaine   Patch 1 Patch Transdermal daily  lidocaine   Patch 1 Patch Transdermal daily  nicotine - 21 mG/24Hr(s) Patch 1 patch Transdermal daily      CODE STATUS: Full Code HISTORY  76y Male w/ PMH HTN, HLD [place] [facility] [place] with severe paresthesia, and severe hyperalgesia in b/l forearms and hands. Admitted to ICU w/ spinal cord syndrome for close neurologic monitoring.    24 HOUR EVENTS:  - Transfer[place] [facility] [place]  - MRI revealed c/f central cord syndrome  - admitted to SICU for q1hr neurochecks  - Fitted for Aspen collar  - pain continued to be issue. 1x tramadol 50mg given, lidocaine patches ordered for b/l arms  - melatonin ordered for insomnia    SUBJECTIVE/ROS:  Patient states pain is minimally improved. States pain had been improving some in early evening until he attempted to remove his dentures. At which point his pain was provoked again. Endorses getting some rest overnight. Patient reports history of LBBB, for which he has a Cardiologist who has been monitoring without active/ongoing treatment.    [ ] A ten-point review of systems was otherwise negative except as noted.  [ ] Due to altered mental status/intubation, subjective information were not able to be obtained from the patient. History was obtained, to the extent possible, from review of the chart and collateral sources of information.      NEURO  RASS: 0    GCS: 15    CAM ICU: neg  Exam: AOx3, NAD, ROM in BUE limited 2/2 severe pain. BUE strength 4/5, exam limited by pain  Meds: acetaminophen   Tablet .. 650 milliGRAM(s) Oral every 6 hours PRN Mild Pain (1 - 3)  gabapentin 300 milliGRAM(s) Oral every 8 hours  melatonin 5 milliGRAM(s) Oral at bedtime    [x] Adequacy of sedation and pain control has been assessed and adjusted      RESPIRATORY  RR: 16 (07-10-21 @ 01:00) (15 - 21)  SpO2: 95% (07-10-21 @ 01:00) (92% - 97%)  Wt(kg): --  Exam: unlabored, minimal expiratory wheezing  Mechanical Ventilation:   ABG - ( 09 Jul 2021 15:11 )  pH: x     /  pCO2: x     /  pO2: x     / HCO3: x     / Base Excess: x     /  SaO2: x       Lactate: 1.1      [ ] Extubation Readiness Assessed  Meds:       CARDIOVASCULAR  HR: 66 (07-10-21 @ 01:00) (63 - 106)  BP: 129/85 (07-10-21 @ 01:00) (106/80 - 135/83)  BP(mean): 99 (07-10-21 @ 01:00) (88 - 103)  ABP: --  ABP(mean): --  Wt(kg): --  CVP(cm H2O): --    Lactate, Blood: 1.1 mmol/L (07-09 @ 15:11)    Exam: RRR, normal S1/S2  Cardiac Rhythm: SB/NSR  Perfusion     [x ]Adequate   [ ]Inadequate  Mentation   [ x]Normal       [ ]Reduced  Extremities  [x ]Warm         [ ]Cool  Volume Status [ ]Hypervolemic [x ]Euvolemic [ ]Hypovolemic  Meds:       GI/NUTRITION  Exam: Soft, non-tender, non-distended  Diet, Regular:   Low Fat (LOWFAT) (07-09-21 @ 16:38) [Active]  Meds: pantoprazole    Tablet 40 milliGRAM(s) Oral before breakfast      GENITOURINARY  I&O's Detail    07-09 @ 07:01  -  07-10 @ 03:36  --------------------------------------------------------  IN:    sodium chloride 0.9%: 400 mL  Total IN: 400 mL    OUT:    Voided (mL): 400 mL  Total OUT: 400 mL    Total NET: 0 mL        Weight (kg): 68.9 (07-09 @ 13:52)      07-10    136  |  100  |  42.7<H>  ----------------------------<  137<H>  4.3   |  22.0  |  1.62<H>    Ca    8.6      10 Jul 2021 04:48  Phos  4.9     07-10  Mg     2.1     07-10    TPro  7.0  /  Alb  4.6  /  TBili  0.5  /  DBili  x   /  AST  76<H>  /  ALT  27  /  AlkPhos  63  07-09      [ ] Conrad catheter, indication: N/A  Meds: sodium chloride 0.9%. 1000 milliLiter(s) IV Continuous <Continuous>        HEMATOLOGIC  Meds: enoxaparin Injectable 30 milliGRAM(s) SubCutaneous two times a day    [x] VTE Prophylaxis                        12.4   10.19 )-----------( 297      ( 10 Jul 2021 04:48 )             37.1     Transfusion     [ ] PRBC   [ ] Platelets   [ ] FFP   [ ] Cryoprecipitate      INFECTIOUS DISEASES  T(C): 36.8 (07-09-21 @ 23:24), Max: 36.9 (07-09-21 @ 20:20)  Wt(kg): --  WBC Count: 10.22 K/uL (07-09 @ 12:40)    Recent Cultures:    Meds:       ENDOCRINE  Capillary Blood Glucose    Meds: dexAMETHasone  Injectable 4 milliGRAM(s) IV Push every 6 hours  insulin lispro (ADMELOG) corrective regimen sliding scale   SubCutaneous three times a day before meals        ACCESS DEVICES:  [x ] Peripheral IV  [ ] Central Venous Line	[ ] R	[ ] L	[ ] IJ	[ ] Fem	[ ] SC	Placed:   [ ] Arterial Line		[ ] R	[ ] L	[ ] Fem	[ ] Rad	[ ] Ax	Placed:   [ ] PICC:					[ ] Mediport  [ ] Urinary Catheter, Date Placed:   [ ] Necessity of urinary, arterial, and venous catheters discussed    OTHER MEDICATIONS:  lidocaine   Patch 1 Patch Transdermal daily  lidocaine   Patch 1 Patch Transdermal daily  nicotine - 21 mG/24Hr(s) Patch 1 patch Transdermal daily      CODE STATUS: Full Code

## 2021-07-10 NOTE — PROGRESS NOTE ADULT - ASSESSMENT
Patient is a 76y old Male w/ HTN and HLD presents as transfer from [facility] for further evaluation of suspected central cord syndrome. Work-up revealed no acute injuries and MRI performed here shows active degenerative disease. Abrasions to left side of face with noted hemostasis. No acute, surgical injuries identified. Neurosurgery evaluated patient and concern for central cord syndrome. Hemodynamically stable.     Neuro: AOx3. CIWA ordered but has not required. Tylenol PRN, gabapentin 300 TID, lidocaine patch to each forearm    Pulm: RA    Cardiac: SB/NSR    GI: protonix    Diet/IVF: low fat diet/IVL    : voiding     Endo: decadron, q6hr fs    Heme/DVT: SCDs/LVX    ID: none    Dispo: SICU, downgrade today   Patient is a 76y old Male w/ HTN and HLD presents as transfer from [facility] for further evaluation of suspected central cord syndrome. Work-up revealed no acute injuries and MRI performed here shows active degenerative disease. Abrasions to left side of face with noted hemostasis. No acute, surgical injuries identified. Neurosurgery evaluated patient and concern for central cord syndrome. Hemodynamically stable.     Neuro: Maintain cervical collar. CIWA ordered but has not required overnight. Tylenol PRN, gabapentin 300 TID, lidocaine patch to each forearm.     Pulm: No active issues. RA    Cardiac: SB/NSR, will likely need tele once downgraded from ICU.    GI: protonix for GI ppx while on steroids.     Diet/IVF: low fat diet/NS @125    : voiding, KENROY this AM on labs. F/u urine lytes, cr, osm. Repeat BMP in PM    Endo: decadron, q6hr fs    Heme/DVT: SCDs/LVX    ID: none    Dispo: SICU, possibly downgrade today

## 2021-07-11 LAB
ANION GAP SERPL CALC-SCNC: 11 MMOL/L — SIGNIFICANT CHANGE UP (ref 5–17)
APPEARANCE UR: CLEAR — SIGNIFICANT CHANGE UP
BASOPHILS # BLD AUTO: 0 K/UL — SIGNIFICANT CHANGE UP (ref 0–0.2)
BASOPHILS NFR BLD AUTO: 0 % — SIGNIFICANT CHANGE UP (ref 0–2)
BILIRUB UR-MCNC: NEGATIVE — SIGNIFICANT CHANGE UP
BUN SERPL-MCNC: 37.2 MG/DL — HIGH (ref 8–20)
CALCIUM SERPL-MCNC: 8 MG/DL — LOW (ref 8.6–10.2)
CHLORIDE SERPL-SCNC: 110 MMOL/L — HIGH (ref 98–107)
CO2 SERPL-SCNC: 20 MMOL/L — LOW (ref 22–29)
COLOR SPEC: YELLOW — SIGNIFICANT CHANGE UP
CREAT ?TM UR-MCNC: 89 MG/DL — SIGNIFICANT CHANGE UP
CREAT SERPL-MCNC: 0.85 MG/DL — SIGNIFICANT CHANGE UP (ref 0.5–1.3)
DIFF PNL FLD: NEGATIVE — SIGNIFICANT CHANGE UP
EOSINOPHIL # BLD AUTO: 0 K/UL — SIGNIFICANT CHANGE UP (ref 0–0.5)
EOSINOPHIL NFR BLD AUTO: 0 % — SIGNIFICANT CHANGE UP (ref 0–6)
GLUCOSE SERPL-MCNC: 132 MG/DL — HIGH (ref 70–99)
GLUCOSE UR QL: NEGATIVE MG/DL — SIGNIFICANT CHANGE UP
HCT VFR BLD CALC: 34.6 % — LOW (ref 39–50)
HGB BLD-MCNC: 11.7 G/DL — LOW (ref 13–17)
IMM GRANULOCYTES NFR BLD AUTO: 0.6 % — SIGNIFICANT CHANGE UP (ref 0–1.5)
KETONES UR-MCNC: NEGATIVE — SIGNIFICANT CHANGE UP
LEUKOCYTE ESTERASE UR-ACNC: NEGATIVE — SIGNIFICANT CHANGE UP
LYMPHOCYTES # BLD AUTO: 0.24 K/UL — LOW (ref 1–3.3)
LYMPHOCYTES # BLD AUTO: 2.4 % — LOW (ref 13–44)
MAGNESIUM SERPL-MCNC: 2 MG/DL — SIGNIFICANT CHANGE UP (ref 1.6–2.6)
MCHC RBC-ENTMCNC: 32 PG — SIGNIFICANT CHANGE UP (ref 27–34)
MCHC RBC-ENTMCNC: 33.8 GM/DL — SIGNIFICANT CHANGE UP (ref 32–36)
MCV RBC AUTO: 94.5 FL — SIGNIFICANT CHANGE UP (ref 80–100)
MONOCYTES # BLD AUTO: 0.33 K/UL — SIGNIFICANT CHANGE UP (ref 0–0.9)
MONOCYTES NFR BLD AUTO: 3.3 % — SIGNIFICANT CHANGE UP (ref 2–14)
NEUTROPHILS # BLD AUTO: 9.44 K/UL — HIGH (ref 1.8–7.4)
NEUTROPHILS NFR BLD AUTO: 93.7 % — HIGH (ref 43–77)
NITRITE UR-MCNC: NEGATIVE — SIGNIFICANT CHANGE UP
OSMOLALITY UR: 748 MOSM/KG — SIGNIFICANT CHANGE UP (ref 300–1000)
PH UR: 6 — SIGNIFICANT CHANGE UP (ref 5–8)
PHOSPHATE SERPL-MCNC: 3.2 MG/DL — SIGNIFICANT CHANGE UP (ref 2.4–4.7)
PLATELET # BLD AUTO: 257 K/UL — SIGNIFICANT CHANGE UP (ref 150–400)
POTASSIUM SERPL-MCNC: 4.3 MMOL/L — SIGNIFICANT CHANGE UP (ref 3.5–5.3)
POTASSIUM SERPL-SCNC: 4.3 MMOL/L — SIGNIFICANT CHANGE UP (ref 3.5–5.3)
PROT UR-MCNC: 100 MG/DL
RBC # BLD: 3.66 M/UL — LOW (ref 4.2–5.8)
RBC # FLD: 12.7 % — SIGNIFICANT CHANGE UP (ref 10.3–14.5)
SODIUM SERPL-SCNC: 141 MMOL/L — SIGNIFICANT CHANGE UP (ref 135–145)
SODIUM UR-SCNC: 83 MMOL/L — SIGNIFICANT CHANGE UP
SP GR SPEC: 1.02 — SIGNIFICANT CHANGE UP (ref 1.01–1.02)
UROBILINOGEN FLD QL: NEGATIVE MG/DL — SIGNIFICANT CHANGE UP
WBC # BLD: 10.07 K/UL — SIGNIFICANT CHANGE UP (ref 3.8–10.5)
WBC # FLD AUTO: 10.07 K/UL — SIGNIFICANT CHANGE UP (ref 3.8–10.5)

## 2021-07-11 PROCEDURE — 99232 SBSQ HOSP IP/OBS MODERATE 35: CPT

## 2021-07-11 PROCEDURE — 99233 SBSQ HOSP IP/OBS HIGH 50: CPT

## 2021-07-11 RX ORDER — SENNA PLUS 8.6 MG/1
2 TABLET ORAL AT BEDTIME
Refills: 0 | Status: DISCONTINUED | OUTPATIENT
Start: 2021-07-11 | End: 2021-07-12

## 2021-07-11 RX ORDER — SODIUM CHLORIDE 9 MG/ML
1000 INJECTION INTRAMUSCULAR; INTRAVENOUS; SUBCUTANEOUS ONCE
Refills: 0 | Status: COMPLETED | OUTPATIENT
Start: 2021-07-11 | End: 2021-07-11

## 2021-07-11 RX ORDER — SODIUM CHLORIDE 9 MG/ML
1000 INJECTION, SOLUTION INTRAVENOUS
Refills: 0 | Status: DISCONTINUED | OUTPATIENT
Start: 2021-07-11 | End: 2021-07-11

## 2021-07-11 RX ADMIN — ENOXAPARIN SODIUM 30 MILLIGRAM(S): 100 INJECTION SUBCUTANEOUS at 05:55

## 2021-07-11 RX ADMIN — Medication 4 MILLIGRAM(S): at 05:57

## 2021-07-11 RX ADMIN — SODIUM CHLORIDE 100 MILLILITER(S): 9 INJECTION, SOLUTION INTRAVENOUS at 05:57

## 2021-07-11 RX ADMIN — Medication 4 MILLIGRAM(S): at 12:06

## 2021-07-11 RX ADMIN — Medication 5 MILLIGRAM(S): at 22:18

## 2021-07-11 RX ADMIN — GABAPENTIN 300 MILLIGRAM(S): 400 CAPSULE ORAL at 14:17

## 2021-07-11 RX ADMIN — SENNA PLUS 2 TABLET(S): 8.6 TABLET ORAL at 22:18

## 2021-07-11 RX ADMIN — Medication 4 MILLIGRAM(S): at 23:41

## 2021-07-11 RX ADMIN — SODIUM CHLORIDE 1000 MILLILITER(S): 9 INJECTION INTRAMUSCULAR; INTRAVENOUS; SUBCUTANEOUS at 01:45

## 2021-07-11 RX ADMIN — Medication 4 MILLIGRAM(S): at 18:05

## 2021-07-11 RX ADMIN — ENOXAPARIN SODIUM 30 MILLIGRAM(S): 100 INJECTION SUBCUTANEOUS at 18:05

## 2021-07-11 RX ADMIN — ATORVASTATIN CALCIUM 40 MILLIGRAM(S): 80 TABLET, FILM COATED ORAL at 22:18

## 2021-07-11 RX ADMIN — PANTOPRAZOLE SODIUM 40 MILLIGRAM(S): 20 TABLET, DELAYED RELEASE ORAL at 06:03

## 2021-07-11 RX ADMIN — GABAPENTIN 300 MILLIGRAM(S): 400 CAPSULE ORAL at 05:57

## 2021-07-11 RX ADMIN — GABAPENTIN 300 MILLIGRAM(S): 400 CAPSULE ORAL at 22:18

## 2021-07-11 NOTE — DIETITIAN INITIAL EVALUATION ADULT. - PERTINENT LABORATORY DATA
07-11 Na141 mmol/L Glu 132 mg/dL<H> K+ 4.3 mmol/L Cr  0.85 mg/dL BUN 37.2 mg/dL<H> Phos 3.2 mg/dL Alb n/a   PAB n/a

## 2021-07-11 NOTE — PROGRESS NOTE ADULT - SUBJECTIVE AND OBJECTIVE BOX
INTERVAL HPI/OVERNIGHT EVENTS/SUBJECTIVE: Pt admits improving weakness and pain to BL hands although still present.  Denies neck pain, worse numbness, weakness, any LE pain, numbness or weakness.  States "I'm ready to get up and go home". Had MAPs 78-80. Mild KENROY.  Given 1 L bolus of IVF for improvement.     ICU Vital Signs Last 24 Hrs  T(C): 36.6 (11 Jul 2021 00:51), Max: 37.1 (10 Jul 2021 19:45)  T(F): 97.8 (11 Jul 2021 00:51), Max: 98.7 (10 Jul 2021 19:45)  HR: 50 (11 Jul 2021 01:00) (50 - 92)  BP: 105/67 (11 Jul 2021 01:00) (105/59 - 141/91)  BP(mean): 80 (11 Jul 2021 01:00) (72 - 107)  ABP: --  ABP(mean): --  RR: 16 (11 Jul 2021 01:00) (12 - 21)  SpO2: 95% (11 Jul 2021 01:00) (93% - 95%)      I&O's Detail    09 Jul 2021 07:01  -  10 Jul 2021 07:00  --------------------------------------------------------  IN:    Oral Fluid: 440 mL    sodium chloride 0.9%: 250 mL    sodium chloride 0.9%: 1100 mL  Total IN: 1790 mL    OUT:    Voided (mL): 600 mL  Total OUT: 600 mL    Total NET: 1190 mL      10 Jul 2021 07:01  -  11 Jul 2021 02:15  --------------------------------------------------------  IN:    Oral Fluid: 240 mL    sodium chloride 0.9%: 2375 mL  Total IN: 2615 mL    OUT:    Voided (mL): 1350 mL  Total OUT: 1350 mL    Total NET: 1265 mL                MEDICATIONS  (STANDING):  atorvastatin 40 milliGRAM(s) Oral at bedtime  dexAMETHasone  Injectable 4 milliGRAM(s) IV Push every 6 hours  enoxaparin Injectable 30 milliGRAM(s) SubCutaneous two times a day  gabapentin 300 milliGRAM(s) Oral every 8 hours  lidocaine   Patch 1 Patch Transdermal daily  lidocaine   Patch 1 Patch Transdermal daily  melatonin 5 milliGRAM(s) Oral at bedtime  nicotine - 21 mG/24Hr(s) Patch 1 patch Transdermal daily  pantoprazole    Tablet 40 milliGRAM(s) Oral before breakfast  sodium chloride 0.9% Bolus 1000 milliLiter(s) IV Bolus once  sodium chloride 0.9%. 1000 milliLiter(s) (125 mL/Hr) IV Continuous <Continuous>    MEDICATIONS  (PRN):      NUTRITION/IVF: Low fat diet, NS @ 125    PHYSICAL EXAM:     Gen: NAD, Well appearing, in collar. No cyanosis, Pallor.    Eyes: PERRL ~ 3mm, EOMI,     Neurological: A&Ox3, GCS 15. ~2/3  strength to BL UE.  5/5 strength all else.      Neck: Supple. Collar in place. ROM Deferred.     Pulmonary: NAD, CTA, = BL .      Cardiovascular: RRR, S1, S2, No Murmurs, rubs or gallops noted.    Gastrointestinal: ND, Soft, NT.    Extremities: NT, AT, no edema, erythema or palpable cord noted.   = 2+ pulses throughout.      LABS:  CBC Full  -  ( 10 Jul 2021 04:48 )  WBC Count : 10.19 K/uL  RBC Count : 3.92 M/uL  Hemoglobin : 12.4 g/dL  Hematocrit : 37.1 %  Platelet Count - Automated : 297 K/uL  Mean Cell Volume : 94.6 fl  Mean Cell Hemoglobin : 31.6 pg  Mean Cell Hemoglobin Concentration : 33.4 gm/dL  Auto Neutrophil # : 9.71 K/uL  Auto Lymphocyte # : 0.21 K/uL  Auto Monocyte # : 0.22 K/uL  Auto Eosinophil # : 0.00 K/uL  Auto Basophil # : 0.01 K/uL  Auto Neutrophil % : 95.2 %  Auto Lymphocyte % : 2.1 %  Auto Monocyte % : 2.2 %  Auto Eosinophil % : 0.0 %  Auto Basophil % : 0.1 %    07-10    138  |  104  |  45.3<H>  ----------------------------<  128<H>  4.2   |  20.0<L>  |  1.35<H>    Ca    8.5<L>      10 Jul 2021 11:30  Phos  4.9     07-10  Mg     2.1     07-10    TPro  7.0  /  Alb  4.6  /  TBili  0.5  /  DBili  x   /  AST  76<H>  /  ALT  27  /  AlkPhos  63  07-09    PT/INR - ( 09 Jul 2021 12:40 )   PT: 10.5 sec;   INR: 0.90 ratio         PTT - ( 09 Jul 2021 12:40 )  PTT:33.6 sec    RECENT CULTURES:      LIVER FUNCTIONS - ( 09 Jul 2021 12:40 )  Alb: 4.6 g/dL / Pro: 7.0 g/dL / ALK PHOS: 63 U/L / ALT: 27 U/L / AST: 76 U/L / GGT: x               CAPILLARY BLOOD GLUCOSE      RADIOLOGY & ADDITIONAL STUDIES:    ASSESSMENT/PLAN:  76yMale presenting with: Central cord syndrome (C4-C6) with hyperalgesia, KENROY.     Neurological: Maintain Q 1 Neuro checks currently.  Will liberate to Q 4 later today. Maintain C-Collar at all times. NSX plan appreciated.     Neck: Maintain Collar at all times.     Cardiovascular: Given Bolus of IVF to maintain supra-normal MAPs >79.  Will liberate this as well today and maintain MAPs >65. Hold antihypertensives.    Gastrointestinal: Low fat diet as tolerated.      Genitourinary: Monitor Cr.  Voiding fine without Conrad.  If Cr improving in AM, Will IVL.    Heme: Lovenox 30BID for increased risk in spinal cord injury    ID: None    Skin:    Lines/ Tubes: No invasive lines or tubes/    Dispo: Pt stable to downgrade from SICU.
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
## 2021-07-11 NOTE — CHART NOTE - NSCHARTNOTEFT_GEN_A_CORE
SICU TRANSFER NOTE  -----------------------------  ICU Admission Date: 7/11  Transfer Date: 07-11-21 @ 13:53    Admission Diagnosis: central cord syndrome with BL UE dysesthesias    Active Problems/injuries: central cord syndrome    Procedures: XXXXX INCLUDE DATES    Consultants:  [ ] Cardiology  [ ] Endocrine  [ ] Infectious Disease  [ ] Medicine  [x ]Neurosurgery  [ ] Ortho       [ ] Weight Bearing Status:  [ ] Palliative       [ ] Advanced Directives:    [ ] Physical Medicine and Rehab       [ ] Disposition :   [ ] Plastics  [ ] Pulmonary    Medications  atorvastatin 40 milliGRAM(s) Oral at bedtime  dexAMETHasone  Injectable 4 milliGRAM(s) IV Push every 6 hours  enoxaparin Injectable 30 milliGRAM(s) SubCutaneous two times a day  gabapentin 300 milliGRAM(s) Oral every 8 hours  lidocaine   Patch 1 Patch Transdermal daily  lidocaine   Patch 1 Patch Transdermal daily  melatonin 5 milliGRAM(s) Oral at bedtime  nicotine - 21 mG/24Hr(s) Patch 1 patch Transdermal daily  pantoprazole    Tablet 40 milliGRAM(s) Oral before breakfast  senna 2 Tablet(s) Oral at bedtime      [x ] I attest I have reviewed and reconciled all medications prior to transfer    IV Fluids  sodium chloride 0.9% Bolus:   1000 milliLiter(s), IV Bolus, once, infuse over 60 Minute(s), Stop After 1 Doses  Provider's Contact #: [phone]  sodium chloride 0.9%.: Solution, 1000 milliLiter(s) infuse at 125 mL/Hr  sodium chloride 0.9%.: Solution, 1000 milliLiter(s) infuse at 100 mL/Hr, Stop After 1 Doses  Provider's Contact #: [phone]    Indication: XXXXXX    Antibiotics:    Indication: XXXXXXX End Date:XXXXXXX      I have discussed this case with Dr. Lerma upon transfer and all questions regarding ICU course were answered.  The following items are to be followed up:        Pt endorses pain is significantly improve  -Remains in C Collar and on Decadron  -Will need to follow up plan for Decadron    Monitor FS while pt continues on Decadron    PT/OT ordered for pt  -C-collar on at all times

## 2021-07-11 NOTE — PHYSICAL THERAPY INITIAL EVALUATION ADULT - ASSISTIVE DEVICE FOR STAIR TRANSFER, REHAB EVAL
however pt encouraged to use handrails at home due to poor balance. pt reports understanding./no rail(s)

## 2021-07-11 NOTE — DIETITIAN INITIAL EVALUATION ADULT. - OTHER INFO
Patient is a 76y old  Male w/ reported PMH of HTN and hLD presents as transfer from [facility] for further evaluation of suspected central cord syndrome. Patient was babysitting his grandchildren when he fell forward down 3 concrete steps, landing on his face and hands. Presenting with central cord syndrome (C4-C6) with hyperalgesia, KENROY. Pt continues with good po intake, family brings food from home. Pt dislikes hospital food. Pt educated on importance of small frequent meals and HBV protein.

## 2021-07-11 NOTE — CHART NOTE - NSCHARTNOTEFT_GEN_A_CORE
HPI/OVERNIGHT EVENTS:    Patient was transferred to ICU to floor in stable condition, with no acute events. Patient denies any pain. he remains on IV steroids q6h. Patient seen by PT on floor, home w/Pt recommended.     MEDICATIONS  (STANDING):  atorvastatin 40 milliGRAM(s) Oral at bedtime  dexAMETHasone  Injectable 4 milliGRAM(s) IV Push every 6 hours  enoxaparin Injectable 30 milliGRAM(s) SubCutaneous two times a day  gabapentin 300 milliGRAM(s) Oral every 8 hours  lidocaine   Patch 1 Patch Transdermal daily  lidocaine   Patch 1 Patch Transdermal daily  melatonin 5 milliGRAM(s) Oral at bedtime  nicotine - 21 mG/24Hr(s) Patch 1 patch Transdermal daily  pantoprazole    Tablet 40 milliGRAM(s) Oral before breakfast  senna 2 Tablet(s) Oral at bedtime    MEDICATIONS  (PRN):      Vital Signs Last 24 Hrs  T(C): 36.3 (2021 22:13), Max: 36.8 (2021 15:58)  T(F): 97.4 (2021 22:13), Max: 98.3 (2021 15:58)  HR: 61 (2021 22:13) (48 - 68)  BP: 112/72 (2021 22:13) (105/59 - 165/96)  BP(mean): 110 (2021 16:00) (74 - 117)  RR: 18 (2021 22:13) (14 - 23)  SpO2: 96% (2021 22:13) (93% - 97%)    Constitutional: patient resting comfortably in bed, in no acute distress  HEENT: EOMI, aspen collar in place  Respiratory: respirations are unlabored, no accessory muscle use, no conversational dyspnea  Cardiovascular: regular rate & rhythm  Gastrointestinal: Abdomen soft, non-tender, non-distended, no rebound tenderness / guarding  Neurological: GCS: 15 (4/5/6). A&O x 3;  4/4 motor strength b/l; sensory intact, no gross neurological deficits   Psychiatric: Normal mood, normal affect  Musculoskeletal: No joint pain, swelling or deformity; obvious R hand deformity      I&O's Detail    10 Jul 2021 07:01  -  2021 07:00  --------------------------------------------------------  IN:    multiple electrolytes Injection Type 1.: 500 mL    Oral Fluid: 390 mL    sodium chloride 0.9%: 2375 mL    Sodium Chloride 0.9% Bolus: 1000 mL  Total IN: 4265 mL    OUT:    Voided (mL): 1900 mL  Total OUT: 1900 mL    Total NET: 2365 mL      2021 07:01  -  2021 22:38  --------------------------------------------------------  IN:    multiple electrolytes Injection Type 1.: 200 mL  Total IN: 200 mL    OUT:  Total OUT: 0 mL    Total NET: 200 mL          LABS:                        11.7   10.07 )-----------( 257      ( 2021 04:55 )             34.6     07-11    141  |  110<H>  |  37.2<H>  ----------------------------<  132<H>  4.3   |  20.0<L>  |  0.85    Ca    8.0<L>      2021 04:55  Phos  3.2       Mg     2.0             Urinalysis Basic - ( 2021 04:56 )    Color: Yellow / Appearance: Clear / S.020 / pH: x  Gluc: x / Ketone: Negative  / Bili: Negative / Urobili: Negative mg/dL   Blood: x / Protein: 100 mg/dL / Nitrite: Negative   Leuk Esterase: Negative / RBC: x / WBC x   Sq Epi: x / Non Sq Epi: x / Bacteria: x    Assessment/Plan:    Mr. Leiva is a 75 yo M with hx of HTN, HLD transferred [facility] s/p fall, patient noted to have severe paresthesia and hyperalgesia in b/l forearm and hands, and transferred to [facility]. MRI demonstrated trace prevertebral edema from C4-C6. Patient was sent to ICU for close neurological monitoring, and steroid tx. Pain was well-controlled, and patient had improvement of neurological symptoms. He was transferred to the floor on .       Plan:   - Pain control MMD   - NSGY following, appreciate recs   1. Maintain cervical collar   2. Continue neuro checks   3. Pneumatic compression device while in bed   4. Tylenol for pain   - Low fat diet   - Decadron 4mg, q6h         Wean steroids   - PT/OT (last seen in ICU): home w/ assist; home w/ home PT; with use of cane; to improve dynamic balance and decrease fall risk.  - SCDS, lovenox    Dispo: Discharge pending decadron plan

## 2021-07-11 NOTE — PHYSICAL THERAPY INITIAL EVALUATION ADULT - ASSISTIVE DEVICE FOR TRANSFER: GAIT, REHAB EVAL
pt deferred use of RW, pt educated on benefits of RW/ cane as pt demonstrates increase lateral swaying and veering with ambulation, primarily with horizontal head turns. pt reports he will use his cane at home.

## 2021-07-11 NOTE — PHYSICAL THERAPY INITIAL EVALUATION ADULT - GENERAL OBSERVATIONS, REHAB EVAL
pt received in chair position in bed + IV lock + tele + BP cuff + VCBs + C-collar YESENIA myrick at bedside, A&OX4, NAD and willing to participate with PT.

## 2021-07-11 NOTE — PROGRESS NOTE ADULT - SUBJECTIVE AND OBJECTIVE BOX
Neurosurgery PA-C  Daily note     Patient is a 76 year old male with a past medical history significant for HTN and hLD presents as transfer from [facility] for further evaluation of suspected central cord syndrome. Patient was babysitting his grandchildren when he fell forward down 3 concrete steps, landing on his face and hands. Patient Complained of pain at both hands with hypersensitivity from elbow to hand bilaterally. [place] [facility] [place], transferred to [facility] for MRI. Patient is awake and conversant and denies pain.     INTERVAL HPI OVERNIGHT EVENTS:  This is a 76 year old right dominant male with central cord syndrome seen this morning, with improvement in bilateral upper extremity strength.     Vital Signs Last 24 Hrs  T(C): 36.5 (2021 12:02), Max: 37.1 (10 Jul 2021 19:45)  T(F): 97.7 (2021 12:02), Max: 98.7 (10 Jul 2021 19:45)  HR: 64 (2021 12:00) (48 - 92)  BP: 165/96 (2021 12:00) (105/59 - 165/96)  BP(mean): 117 (2021 12:00) (72 - 117)  RR: 23 (2021 12:00) (14 - 23)  SpO2: 95% (2021 12:00) (93% - 97%)    PHYSICAL EXAM:  GENERAL: NAD, well-developed  HEAD:  +traumatic, facial abrasions   MENTAL STATUS: A A O x3, Appropriately conversant, following simple commands  CRANIAL NERVES: PERRL. EOMI without nystagmus. Tongue midline. Hearing grossly intact. Speech clear.   MOTOR: strength 5/5 b/l lower extremities  Uppers     Delt     Bicep     Tricep     HG  R                5/5       5/5         5/5       4/5  L                5/5       5/5         5/5         4/5  SENSATION: grossly intact to light touch all extremities  CHEST/LUNG: Clear to auscultation bilaterally  HEART: +S1/+S2   ABDOMEN: Soft, nontender, nondistended  EXTREMITIES:  2+ peripheral pulses   SKIN: Warm, dry; no rashes or lesions    LABS:                        11.7   10.07 )-----------( 257      ( 2021 04:55 )             34.6     07-11    141  |  110<H>  |  37.2<H>  ----------------------------<  132<H>  4.3   |  20.0<L>  |  0.85    Ca    8.0<L>      2021 04:55  Phos  3.2       Mg     2.0         Urinalysis Basic - ( 2021 04:56 )    Color: Yellow / Appearance: Clear / S.020 / pH: x  Gluc: x / Ketone: Negative  / Bili: Negative / Urobili: Negative mg/dL   Blood: x / Protein: 100 mg/dL / Nitrite: Negative   Leuk Esterase: Negative / RBC: x / WBC x   Sq Epi: x / Non Sq Epi: x / Bacteria: x    07-10 @ : @ 07:00  --------------------------------------------------------  IN: 4265 mL / OUT: 1900 mL / NET: 2365 mL     @ 07: @ 13:59  --------------------------------------------------------  IN: 200 mL / OUT: 0 mL / NET: 200 mL    RADIOLOGY & ADDITIONAL TESTS  EXAM:  MR SPINE CERVICAL IC                        EXAM:  MR SPINE THORACIC WAW IC                        PROCEDURE DATE:  2021    INTERPRETATION:  CLINICAL HISTORY: Trauma. Status post fall with upper extremity hyperesthesia. Evaluate for cord injury.    TECHNIQUE: MRI of the cervical and thoracic spine with IV contrast.  Limited postcontrast images of the cervical spine included sagittal and T1 fat saturated images.  Thoracic spine consists of sagittal and transaxial T1 and sagittal STIR images precontrast. Postcontrast sagittal and transaxial T1 fat satted images are provided.  7 mls of Gadavist gadolinium-based contrast was administered intravenously without untoward event. 0.5 mls discarded.    COMPARISON: CT cervical spine from 2021.    FINDINGS:    Cervical spine:  No abnormal epidural or cord enhancement. C5 and C6 vertebral enhancement corresponds to areas of edema on the noncontrast T2/STIR images and again is likely related to active degenerative changes. No additional abnormal marrow enhancement.    No abnormal paraspinal enhancement.    Thoracic spine:  The thoracic alignment is intact. There are no acute fractures or evidence of traumatic malalignment. The vertebral body heights are maintained. No vertebral body marrow signal abnormality or enhancement.    The thoracic cord is normal in signal intensity and morphology. No abnormal leptomeningeal or cord enhancement. The conus medullaris terminates at the T12/L1 level and is unremarkable. There is no epidural hematoma or collection.    No significant disc herniation, canal stenosis or neural foraminal narrowing.    The visualized mediastinum and lungs are unremarkable. Images submitted for abdomen demonstrate no acute abnormality.    The paraspinal musculature is unremarkable.    IMPRESSION:  C5 and C6 vertebral body marrow enhancement likely related to active endplate degenerative changes.    No thoracic spine fracture, cord compression or cord signal abnormality.

## 2021-07-11 NOTE — PROGRESS NOTE ADULT - ASSESSMENT
Patient is a 76 year old male with a past medical history significant for HTN and hLD presents as transfer from [facility] s/post fall forward with bilateral hand weakness, central cord syndrome.     1. Maintain cervical collar   2. Continue neuro checks   3. Pneumatic compression device while in bed   4. Tylenol for pain   5. Plan was discussed with Dr Marroquin

## 2021-07-12 ENCOUNTER — TRANSCRIPTION ENCOUNTER (OUTPATIENT)
Age: 76
End: 2021-07-12

## 2021-07-12 VITALS
OXYGEN SATURATION: 95 % | DIASTOLIC BLOOD PRESSURE: 72 MMHG | RESPIRATION RATE: 18 BRPM | SYSTOLIC BLOOD PRESSURE: 142 MMHG | HEART RATE: 59 BPM | TEMPERATURE: 98 F

## 2021-07-12 LAB
ANION GAP SERPL CALC-SCNC: 12 MMOL/L — SIGNIFICANT CHANGE UP (ref 5–17)
BASOPHILS # BLD AUTO: 0 K/UL — SIGNIFICANT CHANGE UP (ref 0–0.2)
BASOPHILS NFR BLD AUTO: 0 % — SIGNIFICANT CHANGE UP (ref 0–2)
BUN SERPL-MCNC: 28.5 MG/DL — HIGH (ref 8–20)
CALCIUM SERPL-MCNC: 8.9 MG/DL — SIGNIFICANT CHANGE UP (ref 8.6–10.2)
CHLORIDE SERPL-SCNC: 104 MMOL/L — SIGNIFICANT CHANGE UP (ref 98–107)
CO2 SERPL-SCNC: 21 MMOL/L — LOW (ref 22–29)
CREAT SERPL-MCNC: 0.7 MG/DL — SIGNIFICANT CHANGE UP (ref 0.5–1.3)
EOSINOPHIL # BLD AUTO: 0 K/UL — SIGNIFICANT CHANGE UP (ref 0–0.5)
EOSINOPHIL NFR BLD AUTO: 0 % — SIGNIFICANT CHANGE UP (ref 0–6)
GLUCOSE SERPL-MCNC: 131 MG/DL — HIGH (ref 70–99)
HCT VFR BLD CALC: 35.4 % — LOW (ref 39–50)
HGB BLD-MCNC: 11.9 G/DL — LOW (ref 13–17)
IMM GRANULOCYTES NFR BLD AUTO: 0.7 % — SIGNIFICANT CHANGE UP (ref 0–1.5)
LYMPHOCYTES # BLD AUTO: 0.27 K/UL — LOW (ref 1–3.3)
LYMPHOCYTES # BLD AUTO: 2.9 % — LOW (ref 13–44)
MAGNESIUM SERPL-MCNC: 1.9 MG/DL — SIGNIFICANT CHANGE UP (ref 1.6–2.6)
MCHC RBC-ENTMCNC: 31.6 PG — SIGNIFICANT CHANGE UP (ref 27–34)
MCHC RBC-ENTMCNC: 33.6 GM/DL — SIGNIFICANT CHANGE UP (ref 32–36)
MCV RBC AUTO: 94.1 FL — SIGNIFICANT CHANGE UP (ref 80–100)
MONOCYTES # BLD AUTO: 0.53 K/UL — SIGNIFICANT CHANGE UP (ref 0–0.9)
MONOCYTES NFR BLD AUTO: 5.8 % — SIGNIFICANT CHANGE UP (ref 2–14)
NEUTROPHILS # BLD AUTO: 8.32 K/UL — HIGH (ref 1.8–7.4)
NEUTROPHILS NFR BLD AUTO: 90.6 % — HIGH (ref 43–77)
PHOSPHATE SERPL-MCNC: 3.5 MG/DL — SIGNIFICANT CHANGE UP (ref 2.4–4.7)
PLATELET # BLD AUTO: 264 K/UL — SIGNIFICANT CHANGE UP (ref 150–400)
POTASSIUM SERPL-MCNC: 4.1 MMOL/L — SIGNIFICANT CHANGE UP (ref 3.5–5.3)
POTASSIUM SERPL-SCNC: 4.1 MMOL/L — SIGNIFICANT CHANGE UP (ref 3.5–5.3)
RBC # BLD: 3.76 M/UL — LOW (ref 4.2–5.8)
RBC # FLD: 12.2 % — SIGNIFICANT CHANGE UP (ref 10.3–14.5)
SODIUM SERPL-SCNC: 137 MMOL/L — SIGNIFICANT CHANGE UP (ref 135–145)
WBC # BLD: 9.18 K/UL — SIGNIFICANT CHANGE UP (ref 3.8–10.5)
WBC # FLD AUTO: 9.18 K/UL — SIGNIFICANT CHANGE UP (ref 3.8–10.5)

## 2021-07-12 PROCEDURE — 84100 ASSAY OF PHOSPHORUS: CPT

## 2021-07-12 PROCEDURE — 80053 COMPREHEN METABOLIC PANEL: CPT

## 2021-07-12 PROCEDURE — 85730 THROMBOPLASTIN TIME PARTIAL: CPT

## 2021-07-12 PROCEDURE — 87635 SARS-COV-2 COVID-19 AMP PRB: CPT

## 2021-07-12 PROCEDURE — 72157 MRI CHEST SPINE W/O & W/DYE: CPT

## 2021-07-12 PROCEDURE — 99232 SBSQ HOSP IP/OBS MODERATE 35: CPT

## 2021-07-12 PROCEDURE — 80048 BASIC METABOLIC PNL TOTAL CA: CPT

## 2021-07-12 PROCEDURE — 72142 MRI NECK SPINE W/DYE: CPT

## 2021-07-12 PROCEDURE — 93005 ELECTROCARDIOGRAM TRACING: CPT

## 2021-07-12 PROCEDURE — 73130 X-RAY EXAM OF HAND: CPT

## 2021-07-12 PROCEDURE — 97167 OT EVAL HIGH COMPLEX 60 MIN: CPT

## 2021-07-12 PROCEDURE — 83605 ASSAY OF LACTIC ACID: CPT

## 2021-07-12 PROCEDURE — 72156 MRI NECK SPINE W/O & W/DYE: CPT

## 2021-07-12 PROCEDURE — 83735 ASSAY OF MAGNESIUM: CPT

## 2021-07-12 PROCEDURE — 82962 GLUCOSE BLOOD TEST: CPT

## 2021-07-12 PROCEDURE — 72141 MRI NECK SPINE W/O DYE: CPT

## 2021-07-12 PROCEDURE — 97163 PT EVAL HIGH COMPLEX 45 MIN: CPT

## 2021-07-12 PROCEDURE — 83935 ASSAY OF URINE OSMOLALITY: CPT

## 2021-07-12 PROCEDURE — 85610 PROTHROMBIN TIME: CPT

## 2021-07-12 PROCEDURE — 85027 COMPLETE CBC AUTOMATED: CPT

## 2021-07-12 PROCEDURE — 36415 COLL VENOUS BLD VENIPUNCTURE: CPT

## 2021-07-12 PROCEDURE — 82570 ASSAY OF URINE CREATININE: CPT

## 2021-07-12 PROCEDURE — 86803 HEPATITIS C AB TEST: CPT

## 2021-07-12 PROCEDURE — 85025 COMPLETE CBC W/AUTO DIFF WBC: CPT

## 2021-07-12 PROCEDURE — 99231 SBSQ HOSP IP/OBS SF/LOW 25: CPT | Mod: GC

## 2021-07-12 PROCEDURE — 83036 HEMOGLOBIN GLYCOSYLATED A1C: CPT

## 2021-07-12 PROCEDURE — 86769 SARS-COV-2 COVID-19 ANTIBODY: CPT

## 2021-07-12 PROCEDURE — 84300 ASSAY OF URINE SODIUM: CPT

## 2021-07-12 PROCEDURE — 81001 URINALYSIS AUTO W/SCOPE: CPT

## 2021-07-12 RX ORDER — PANTOPRAZOLE SODIUM 20 MG/1
1 TABLET, DELAYED RELEASE ORAL
Qty: 30 | Refills: 0
Start: 2021-07-12 | End: 2021-08-10

## 2021-07-12 RX ORDER — GABAPENTIN 400 MG/1
1 CAPSULE ORAL
Qty: 30 | Refills: 0
Start: 2021-07-12 | End: 2021-07-21

## 2021-07-12 RX ORDER — ATORVASTATIN CALCIUM 80 MG/1
1 TABLET, FILM COATED ORAL
Qty: 0 | Refills: 0 | DISCHARGE

## 2021-07-12 RX ORDER — SENNA PLUS 8.6 MG/1
2 TABLET ORAL
Qty: 60 | Refills: 0
Start: 2021-07-12 | End: 2021-08-10

## 2021-07-12 RX ADMIN — PANTOPRAZOLE SODIUM 40 MILLIGRAM(S): 20 TABLET, DELAYED RELEASE ORAL at 05:45

## 2021-07-12 RX ADMIN — Medication 4 MILLIGRAM(S): at 11:22

## 2021-07-12 RX ADMIN — ENOXAPARIN SODIUM 30 MILLIGRAM(S): 100 INJECTION SUBCUTANEOUS at 05:45

## 2021-07-12 RX ADMIN — Medication 4 MILLIGRAM(S): at 05:45

## 2021-07-12 RX ADMIN — GABAPENTIN 300 MILLIGRAM(S): 400 CAPSULE ORAL at 11:23

## 2021-07-12 RX ADMIN — GABAPENTIN 300 MILLIGRAM(S): 400 CAPSULE ORAL at 05:45

## 2021-07-12 NOTE — OCCUPATIONAL THERAPY INITIAL EVALUATION ADULT - LIVES WITH, PROFILE
Pt reports he lives with his wife in a high ranch style house with 1 step to enter from the front and 4 steps to enter from the back, no handrails on either steps + 6 steps upstairs with one handrail + 6 steps downstairs with no handrail. Pt states wife has signs of Alzheimer's and is only able to assist him minimally but that he has very supportive children who can assist as needed./spouse

## 2021-07-12 NOTE — PROGRESS NOTE ADULT - SUBJECTIVE AND OBJECTIVE BOX
Neurosurgery PA-C  Daily note     Patient is a 76 year old right hand dominant female with a past medical history significant for HTN and HLD presents as transfer from [facility] for further evaluation of suspected central cord syndrome. Patient was babysitting his grandchildren when he fell forward down 3 concrete steps, landing on his face and hands. Did not lose consciousness. Was able to ambulate afterwards. Complaining of pain at both hands with hypersensitivity from elbow to hand bilaterally. Work-up at [facility] yielded no acute injuries, transferred to [facility] for MRI. Denies Nausea, vomiting, fever, chills, chest pain, or SOB. C-collar switched for soft collar.     INTERVAL HPI OVERNIGHT EVENTS:  This is a 76 year old right hand dominant male with central cord syndrome, with improvement of bilateral upper extremities weakness.     Vital Signs Last 24 Hrs  T(C): 36.9 (2021 08:00), Max: 36.9 (2021 08:00)  T(F): 98.5 (2021 08:00), Max: 98.5 (2021 08:00)  HR: 59 (2021 08:00) (50 - 68)  BP: 160/80 (2021 04:21) (112/72 - 165/96)  BP(mean): 110 (2021 16:00) (110 - 117)  RR: 18 (2021 08:00) (18 - 23)  SpO2: 95% (2021 08:00) (94% - 98%)    PHYSICAL EXAM:  GENERAL: NAD, well-groomed, well-developed  HEAD:  Atraumatic, normocephalic  NECK: cervical collar in place   MENTAL STATUS: A A O x 3, Appropriately conversant,  following simple commands/mimicking/not following commands  CRANIAL NERVES: PERRL. EOMI without nystagmus. Facial sensation intact V1-3 distribution b/l. Face symmetric w/ normal eye closure and smile, tongue midline. Hearing grossly intact. Speech clear. MOTOR: strength   Uppers     Delt     Bicep     Tricep    HG  R                5/5     5/5        5/5      4+/5  L                5/5      5/5        5/5      4+/5  Lowers      HF     KF      KE     PF     DF      R             5/5     5/5     5/5    5/5   5/5      L              5/5    5/5      5/5    5/5   5/5     SENSATION: grossly intact to light touch all extremities  COORDINATION: Gait intact  EXTREMITIES:  2+ peripheral pulses, no clubbing, cyanosis, or edema  SKIN: Warm, dry; no rashes or lesions    LABS:                        11.9   9.18  )-----------( 264      ( 2021 07:23 )             35.4     07-12    137  |  104  |  28.5<H>  ----------------------------<  131<H>  4.1   |  21.0<L>  |  0.70    Ca    8.9      2021 07:23  Phos  3.5       Mg     1.9         Urinalysis Basic - ( 2021 04:56 )    Color: Yellow / Appearance: Clear / S.020 / pH: x  Gluc: x / Ketone: Negative  / Bili: Negative / Urobili: Negative mg/dL   Blood: x / Protein: 100 mg/dL / Nitrite: Negative   Leuk Esterase: Negative / RBC: x / WBC x   Sq Epi: x / Non Sq Epi: x / Bacteria: x     @ : @ 07:00  --------------------------------------------------------  IN: 200 mL / OUT: 0 mL / NET: 200 mL     @ 07: @ 10:01  --------------------------------------------------------  IN: 360 mL / OUT: 0 mL / NET: 360 mL    RADIOLOGY & ADDITIONAL TESTS:  EXAM:  MR SPINE CERVICAL IC                        EXAM:  MR SPINE THORACIC WAW IC                        PROCEDURE DATE:  2021    INTERPRETATION:  CLINICAL HISTORY: Trauma. Status post fall with upper extremity hyperesthesia. Evaluate for cord injury.    TECHNIQUE: MRI of the cervical and thoracic spine with IV contrast.  Limited postcontrast images of the cervical spine included sagittal and T1 fat saturated images.  Thoracic spine consists of sagittal and transaxial T1 and sagittal STIR images precontrast. Postcontrast sagittal and transaxial T1 fat satted images are provided.  7 mls of Gadavist gadolinium-based contrast was administered intravenously without untoward event. 0.5 mls discarded.    COMPARISON: CT cervical spine from 2021.    FINDINGS:    Cervical spine:  No abnormal epidural or cord enhancement. C5 and C6 vertebral enhancement corresponds to areas of edema on the noncontrast T2/STIR images and again is likely related to active degenerative changes. No additional abnormal marrow enhancement.    No abnormal paraspinal enhancement.    Thoracic spine:  The thoracic alignment is intact. There are no acute fractures or evidence of traumatic malalignment. The vertebral body heights are maintained. No vertebral body marrow signal abnormality or enhancement.    The thoracic cord is normal in signal intensity and morphology. No abnormal leptomeningeal or cord enhancement. The conus medullaris terminates at the T12/L1 level and is unremarkable. There is no epidural hematoma or collection.    No significant disc herniation, canal stenosis or neural foraminal narrowing.    The visualized mediastinum and lungs are unremarkable. Images submitted for abdomen demonstrate no acute abnormality.    The paraspinal musculature is unremarkable.    IMPRESSION:  C5 and C6 vertebral body marrow enhancement likely related to active endplate degenerative changes.    No thoracic spine fracture, cord compression or cord signal abnormality.
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
## 2021-07-12 NOTE — PROGRESS NOTE ADULT - ASSESSMENT
Assessment/Plan:    Mr. Leiva is a 75 yo M with hx of HTN, HLD transferred [facility] s/p fall, patient noted to have severe paresthesia and hyperalgesia in b/l forearm and hands, and transferred to [facility]. MRI demonstrated trace prevertebral edema from C4-C6. Patient was sent to ICU for close neurological monitoring, and steroid tx. Pain was well-controlled, and patient had improvement of neurological symptoms. He was transferred to the floor on 7/11.       Plan:   - Pain control MMD   - NSGY following, appreciate recs   1. Maintain cervical collar   2. Continue neuro checks   3. Pneumatic compression device while in bed   4. Tylenol for pain   - Low fat diet   - Decadron 4mg, q6h         Wean steroids   - PT/OT (last seen in ICU): home w/ assist; home w/ home PT; with use of cane; to improve dynamic balance and decrease fall risk.  - SCDS, lovenox    Dispo: Discharge pending decadron plan.

## 2021-07-12 NOTE — OCCUPATIONAL THERAPY INITIAL EVALUATION ADULT - SPECIAL TRAINING, OT EVAL
Pt ambulated with modified independence using SAC, +external cues around bed area, to/from the bathroom demonstrating good safety awareness and obstacle negotiation. No LOB noted during functional mobility. Pt educated in energy conservation techniques including proper breathing and activity pacing.

## 2021-07-12 NOTE — DISCHARGE NOTE PROVIDER - NSDCMRMEDTOKEN_GEN_ALL_CORE_FT
amLODIPine 5 mg oral tablet: 1 tab(s) orally once a day  atenolol 25 mg oral tablet: 1 tab(s) orally once a day  gabapentin 300 mg oral capsule: 1 cap(s) orally every 8 hours  hydroCHLOROthiazide 25 mg oral tablet: 1 tab(s) orally once a day  losartan 100 mg oral tablet: 1 tab(s) orally once a day  Medrol 2 mg oral tablet: 1 tab(s) orally 3 times a day MDD:medrol dosepak  pantoprazole 40 mg oral delayed release tablet: 1 tab(s) orally once a day (before a meal)  senna oral tablet: 2 tab(s) orally once a day (at bedtime)  Zetia 10 mg oral tablet: 1 tab(s) orally once a day

## 2021-07-12 NOTE — PROGRESS NOTE ADULT - ASSESSMENT
Patient is a 76 year old right hand dominant female with a past medical history significant for HTN and HLD with central cord syndrome.    1. Steroid taper sent to [facility] in [place]   2. Maintain cervical collar at all times   3. Patient may be discharge home from Neurosurgery standpoint  4. Please follow up with Dr Clay Marroquin in 1 to 2 weeks   5. Plan was discussed with Dr Clay Marroquin

## 2021-07-12 NOTE — OCCUPATIONAL THERAPY INITIAL EVALUATION ADULT - GENERAL OBSERVATIONS, REHAB EVAL
Received pt sitting in bedside chair, NAD, +C-collar, +IV lock, +on RA. Patient agreeable to OT evaluation

## 2021-07-12 NOTE — OCCUPATIONAL THERAPY INITIAL EVALUATION ADULT - PERTINENT HX OF CURRENT PROBLEM, REHAB EVAL
Presents as transfer from [facility] for further evaluation of suspected central cord syndrome. MRI demonstrated trace prevertebral edema from C4-C6.

## 2021-07-12 NOTE — OCCUPATIONAL THERAPY INITIAL EVALUATION ADULT - SENSORY TESTS
Pt states intermittent numbness/tingling in Right UE however none present at time of eval; RN aware. Pt with no new complaints or changes in sensation

## 2021-07-12 NOTE — OCCUPATIONAL THERAPY INITIAL EVALUATION ADULT - RANGE OF MOTION EXAMINATION
Bilateral UE AROM all joints WFL except for bilateral shoulder flexion only assessed within spinal precautions to 90 degrees
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
## 2021-07-12 NOTE — DISCHARGE NOTE PROVIDER - CARE PROVIDER_API CALL
Clay Marroquin)  Neurosurgery  [address] [place] [address]  Phone: [phone]  Fax: [phone]  Follow Up Time: 2 weeks

## 2021-07-12 NOTE — OCCUPATIONAL THERAPY INITIAL EVALUATION ADULT - MANUAL MUSCLE TESTING RESULTS, REHAB EVAL
Bilateral shoulders grossly assessed with AROM against gravity 3/5, bilateral elbows grossly assessed with AROM against gravity 3/5, bilateral gross grasp 4+/5

## 2021-07-12 NOTE — PROGRESS NOTE ADULT - ATTENDING COMMENTS
NSGY Attg:    see above    patient seen and examined     UE pain improved    agree with exam and plan as above
Pt seen and examined by me  agree with findings  pain resolved  ok to DC from trauma standpoint
NSGY Attg:    see above    patient seen and examined     significant improvement in bilateral wrist extension and Hg    agree with plan as above  continue collar  continue conservative care for central cord syndrome given significant improvement and patient's wish to avoid surgical intervention
NSGY Attg:    see above    patient seen and examined    continued improvement in motor strength    agree with above  c collar at all times  f/u as outpatient in 2 weeks
Pt seen and examined by me   agree with plan  c collar at all times per NSG  Luis A UE hyperesthesias improving  thom reg diet  DC plan per NSG

## 2021-07-12 NOTE — DISCHARGE NOTE NURSING/CASE MANAGEMENT/SOCIAL WORK - PATIENT PORTAL LINK FT
You can access the FollowMyHealth Patient Portal offered by [facility] by registering at the following website: [email or web address]. By joining [employer]’s FollowMyHealth portal, you will also be able to view your health information using other applications (apps) compatible with our system.

## 2021-07-12 NOTE — PROGRESS NOTE ADULT - SUBJECTIVE AND OBJECTIVE BOX
HPI/OVERNIGHT EVENTS:    Patient was transferred to ICU to floor in stable condition, with no acute events. Patient denies any pain. He remains on IV steroids q6h. Patient seen by PT on floor, home w/Pt recommended.     MEDICATIONS  (STANDING):  atorvastatin 40 milliGRAM(s) Oral at bedtime  dexAMETHasone  Injectable 4 milliGRAM(s) IV Push every 6 hours  enoxaparin Injectable 30 milliGRAM(s) SubCutaneous two times a day  gabapentin 300 milliGRAM(s) Oral every 8 hours  lidocaine   Patch 1 Patch Transdermal daily  lidocaine   Patch 1 Patch Transdermal daily  melatonin 5 milliGRAM(s) Oral at bedtime  nicotine - 21 mG/24Hr(s) Patch 1 patch Transdermal daily  pantoprazole    Tablet 40 milliGRAM(s) Oral before breakfast  senna 2 Tablet(s) Oral at bedtime    MEDICATIONS  (PRN):      Vital Signs Last 24 Hrs  T(C): 36.8 (2021 04:21), Max: 36.8 (2021 15:58)  T(F): 98.2 (2021 04:21), Max: 98.3 (2021 15:58)  HR: 50 (2021 04:21) (50 - 68)  BP: 160/80 (2021 04:21) (112/72 - 165/96)  BP(mean): 110 (2021 16:00) (100 - 117)  RR: 18 (2021 04:21) (14 - 23)  SpO2: 98% (2021 04:21) (93% - 98%)    Constitutional: patient resting comfortably in bed, in no acute distress  HEENT: EOMI, aspen collar in place  Respiratory: respirations are unlabored, no accessory muscle use, no conversational dyspnea  Cardiovascular: regular rate & rhythm  Gastrointestinal: Abdomen soft, non-tender, non-distended, no rebound tenderness / guarding  Neurological: GCS: 15 (4/5/6). A&O x 3;  4/4 motor strength b/l; sensory intact, no gross neurological deficits   Psychiatric: Normal mood, normal affect  Musculoskeletal: No joint pain, swelling or deformity; obvious R hand deformity      I&O's Detail    10 Jul 2021 07:01  -  2021 07:00  --------------------------------------------------------  IN:    multiple electrolytes Injection Type 1.: 500 mL    Oral Fluid: 390 mL    sodium chloride 0.9%: 2375 mL    Sodium Chloride 0.9% Bolus: 1000 mL  Total IN: 4265 mL    OUT:    Voided (mL): 1900 mL  Total OUT: 1900 mL    Total NET: 2365 mL      2021 07:01  -  2021 06:31  --------------------------------------------------------  IN:    multiple electrolytes Injection Type 1.: 200 mL  Total IN: 200 mL    OUT:  Total OUT: 0 mL    Total NET: 200 mL          LABS:                        11.7   10.07 )-----------( 257      ( 2021 04:55 )             34.6     07-11    141  |  110<H>  |  37.2<H>  ----------------------------<  132<H>  4.3   |  20.0<L>  |  0.85    Ca    8.0<L>      2021 04:55  Phos  3.2     07-11  Mg     2.0     -11        Urinalysis Basic - ( 2021 04:56 )    Color: Yellow / Appearance: Clear / S.020 / pH: x  Gluc: x / Ketone: Negative  / Bili: Negative / Urobili: Negative mg/dL   Blood: x / Protein: 100 mg/dL / Nitrite: Negative   Leuk Esterase: Negative / RBC: x / WBC x   Sq Epi: x / Non Sq Epi: x / Bacteria: x

## 2021-07-12 NOTE — DISCHARGE NOTE PROVIDER - NSDCCPCAREPLAN_GEN_ALL_CORE_FT
PRINCIPAL DISCHARGE DIAGNOSIS  Diagnosis: Central cord syndrome  Assessment and Plan of Treatment:

## 2021-07-12 NOTE — PROGRESS NOTE ADULT - REASON FOR ADMISSION
Central Cord Syndrome

## 2021-07-14 PROBLEM — I10 ESSENTIAL (PRIMARY) HYPERTENSION: Chronic | Status: ACTIVE | Noted: 2021-07-09

## 2021-07-14 PROBLEM — E78.00 PURE HYPERCHOLESTEROLEMIA, UNSPECIFIED: Chronic | Status: ACTIVE | Noted: 2021-07-09

## 2021-07-14 PROBLEM — Z00.00 ENCOUNTER FOR PREVENTIVE HEALTH EXAMINATION: Status: ACTIVE | Noted: 2021-07-14

## 2021-07-14 PROBLEM — G56.00 CARPAL TUNNEL SYNDROME, UNSPECIFIED UPPER LIMB: Chronic | Status: ACTIVE | Noted: 2021-07-09

## 2021-07-29 ENCOUNTER — NON-APPOINTMENT (OUTPATIENT)
Age: 76
End: 2021-07-29

## 2021-07-29 ENCOUNTER — APPOINTMENT (OUTPATIENT)
Dept: NEUROSURGERY | Facility: CLINIC | Age: 76
End: 2021-07-29
Payer: MEDICARE

## 2021-07-29 VITALS
DIASTOLIC BLOOD PRESSURE: 100 MMHG | OXYGEN SATURATION: 96 % | SYSTOLIC BLOOD PRESSURE: 142 MMHG | BODY MASS INDEX: 23.78 KG/M2 | TEMPERATURE: 98.7 F | WEIGHT: 148 LBS | HEIGHT: 66 IN | HEART RATE: 62 BPM

## 2021-07-29 DIAGNOSIS — F17.200 NICOTINE DEPENDENCE, UNSPECIFIED, UNCOMPLICATED: ICD-10-CM

## 2021-07-29 DIAGNOSIS — Z82.49 FAMILY HISTORY OF ISCHEMIC HEART DISEASE AND OTHER DISEASES OF THE CIRCULATORY SYSTEM: ICD-10-CM

## 2021-07-29 DIAGNOSIS — Z72.89 OTHER PROBLEMS RELATED TO LIFESTYLE: ICD-10-CM

## 2021-07-29 PROCEDURE — 99213 OFFICE O/P EST LOW 20 MIN: CPT

## 2021-07-29 RX ORDER — AMLODIPINE BESYLATE 5 MG/1
5 TABLET ORAL
Refills: 0 | Status: ACTIVE | COMMUNITY

## 2021-07-29 RX ORDER — HYDROCHLOROTHIAZIDE 25 MG/1
25 TABLET ORAL
Refills: 0 | Status: ACTIVE | COMMUNITY

## 2021-07-29 RX ORDER — LOSARTAN POTASSIUM 100 MG/1
TABLET, FILM COATED ORAL
Refills: 0 | Status: ACTIVE | COMMUNITY

## 2021-07-29 RX ORDER — EZETIMIBE 10 MG/1
10 TABLET ORAL
Refills: 0 | Status: ACTIVE | COMMUNITY

## 2021-07-29 RX ORDER — ATENOLOL 25 MG/1
25 TABLET ORAL
Refills: 0 | Status: ACTIVE | COMMUNITY

## 2021-08-03 NOTE — DATA REVIEWED
[de-identified] : EXAM: MR SPINE CERVICAL IC\par  EXAM: MR SPINE THORACIC WAW IC\par \par PROCEDURE DATE: 07/09/2021\par \par \par \par INTERPRETATION: CLINICAL HISTORY: Trauma. Status post fall with upper extremity hyperesthesia. Evaluate for cord injury.\par \par TECHNIQUE: MRI of the cervical and thoracic spine with IV contrast.\par Limited postcontrast images of the cervical spine included sagittal and T1 fat saturated images.\par Thoracic spine consists of sagittal and transaxial T1 and sagittal STIR images precontrast. Postcontrast sagittal and transaxial T1 fat satted images are provided.\par 7 mls of Gadavist gadolinium-based contrast was administered intravenously without untoward event. 0.5 mls discarded.\par \par COMPARISON: CT cervical spine from 7/8/2021.\par \par FINDINGS:\par \par Cervical spine:\par No abnormal epidural or cord enhancement. C5 and C6 vertebral enhancement corresponds to areas of edema on the noncontrast T2/STIR images and again is likely related to active degenerative changes. No additional abnormal marrow enhancement.\par \par No abnormal paraspinal enhancement.\par \par Thoracic spine:\par The thoracic alignment is intact. There are no acute fractures or evidence of traumatic malalignment. The vertebral body heights are maintained. No vertebral body marrow signal abnormality or enhancement.\par \par The thoracic cord is normal in signal intensity and morphology. No abnormal leptomeningeal or cord enhancement. The conus medullaris terminates at the T12/L1 level and is unremarkable. There is no epidural hematoma or collection.\par \par No significant disc herniation, canal stenosis or neural foraminal narrowing.\par \par The visualized mediastinum and lungs are unremarkable. Images submitted for abdomen demonstrate no acute abnormality.\par \par The paraspinal musculature is unremarkable.\par \par IMPRESSION:\par C5 and C6 vertebral body marrow enhancement likely related to active endplate degenerative changes.\par \par No thoracic spine fracture, cord compression or cord signal abnormality.

## 2021-08-03 NOTE — ASSESSMENT
[FreeTextEntry1] : Mr. Leiva presents for follow-up s/p discharge with interval history as above.  Fortunately, his symptoms have resolved despite his poor compliance.  I will see the patient back in 3 months.  He knows to call the office with any new or concerning symptoms in the interim.

## 2021-08-03 NOTE — PHYSICAL EXAM
[General Appearance - Alert] : alert [General Appearance - In No Acute Distress] : in no acute distress [Oriented To Time, Place, And Person] : oriented to person, place, and time [Impaired Insight] : insight and judgment were intact [Affect] : the affect was normal [Person] : oriented to person [Place] : oriented to place [Time] : oriented to time [Short Term Intact] : short term memory intact [Fluency] : fluency intact [Comprehension] : comprehension intact [Cranial Nerves Optic (II)] : visual acuity intact bilaterally,  pupils equal round and reactive to light [Cranial Nerves Oculomotor (III)] : extraocular motion intact [Cranial Nerves Trigeminal (V)] : facial sensation intact symmetrically [Cranial Nerves Facial (VII)] : face symmetrical [Cranial Nerves Glossopharyngeal (IX)] : tongue and palate midline [Cranial Nerves Accessory (XI - Cranial And Spinal)] : head turning and shoulder shrug symmetric [Cranial Nerves Hypoglossal (XII)] : there was no tongue deviation with protrusion [Motor Tone] : muscle tone was normal in all four extremities [Balance] : balance was intact [Over the Past 2 Weeks, Have You Felt Down, Depressed, or Hopeless?] : 1.) Over the past 2 weeks, have you felt down, depressed, or hopeless? No [Over the Past 2 Weeks, Have You Felt Little Interest or Pleasure Doing Things?] : 2.) Over the past 2 weeks, have you felt little interest or pleasure doing things? No [FreeTextEntry6] : baseline chronic Hg weakness [FreeTextEntry8] : ambulates the assistance of a cane

## 2021-08-03 NOTE — HISTORY OF PRESENT ILLNESS
[de-identified] : Mr. Leiva presents for follow up visit/post hospitalization 7/9-7/12/2021 for central cord syndrome.  He states that his UE paresthesias have resolved.  He states that he is back to his baseline.  He endorses non-compliance with the cervical collar.

## 2021-09-15 ENCOUNTER — RX RENEWAL (OUTPATIENT)
Age: 76
End: 2021-09-15

## 2021-10-28 ENCOUNTER — APPOINTMENT (OUTPATIENT)
Dept: NEUROSURGERY | Facility: CLINIC | Age: 76
End: 2021-10-28
Payer: MEDICARE

## 2021-10-28 VITALS
HEART RATE: 62 BPM | HEIGHT: 65 IN | SYSTOLIC BLOOD PRESSURE: 187 MMHG | DIASTOLIC BLOOD PRESSURE: 75 MMHG | WEIGHT: 148 LBS | BODY MASS INDEX: 24.66 KG/M2 | TEMPERATURE: 98.6 F | OXYGEN SATURATION: 96 %

## 2021-10-28 DIAGNOSIS — S14.129A CENTRAL CORD SYNDROME AT UNSPECIFIED LVL OF CERVICAL SPINAL CORD, INITIAL ENCOUNTER: ICD-10-CM

## 2021-10-28 PROCEDURE — 99213 OFFICE O/P EST LOW 20 MIN: CPT

## 2021-10-28 NOTE — HISTORY OF PRESENT ILLNESS
[de-identified] : Mr. Leiva presents for follow up visit/post hospitalization 7/9-7/12/2021 for central cord syndrome.  He states that his UE paresthesias that wax and wane.   He states that he is back to his baseline.  He endorses non-compliance with the cervical collar. Pain intensity 3/10.  Denies any urinary or bowel incontinence.  He is ambulating with the assistance of a cane as needed.  He does not take the gabapentin secondary to adverse side effects.  He offers no new complaints at today's visit.

## 2021-10-28 NOTE — PHYSICAL EXAM
[General Appearance - Alert] : alert [General Appearance - In No Acute Distress] : in no acute distress [Oriented To Time, Place, And Person] : oriented to person, place, and time [Impaired Insight] : insight and judgment were intact [Affect] : the affect was normal [Person] : oriented to person [Place] : oriented to place [Time] : oriented to time [Short Term Intact] : short term memory intact [Fluency] : fluency intact [Comprehension] : comprehension intact [Cranial Nerves Optic (II)] : visual acuity intact bilaterally,  pupils equal round and reactive to light [Cranial Nerves Oculomotor (III)] : extraocular motion intact [Cranial Nerves Trigeminal (V)] : facial sensation intact symmetrically [Cranial Nerves Facial (VII)] : face symmetrical [Cranial Nerves Glossopharyngeal (IX)] : tongue and palate midline [Cranial Nerves Accessory (XI - Cranial And Spinal)] : head turning and shoulder shrug symmetric [Cranial Nerves Hypoglossal (XII)] : there was no tongue deviation with protrusion [Motor Tone] : muscle tone was normal in all four extremities [Balance] : balance was intact [No Visual Abnormalities] : no visible abnormailities [Antalgic] : antalgic [Over the Past 2 Weeks, Have You Felt Down, Depressed, or Hopeless?] : 1.) Over the past 2 weeks, have you felt down, depressed, or hopeless? No [Over the Past 2 Weeks, Have You Felt Little Interest or Pleasure Doing Things?] : 2.) Over the past 2 weeks, have you felt little interest or pleasure doing things? No [FreeTextEntry6] : baseline chronic Hg weakness [FreeTextEntry8] : ambulates the assistance of a cane

## 2021-10-28 NOTE — ASSESSMENT
[FreeTextEntry1] : Mr. Leiva presents for follow-up s/p discharge with interval history as above.  Fortunately, his symptoms have resolved despite his poor compliance.  \par He offers no new neurological complaints and does not wish to take gabapentin secondary to side effects. \par Maintain fall precautions.\par  He knows to call the office with any new or concerning symptoms in the interim.  \par \par I, Dr. Clay Marroquin, personally performed the evaluation and management (E/M) services for this patient.  That E/M includes assessment of the initial examination, assessing the pertinent medical and surgical history, and establishing the plan of care.  At the time of the visit, my ACP, Ana Ayers, actively participated in the evaluation and management services for this patient to be followed going forward in accordance with the plan documented in the clinical note.\par \par \par

## 2021-10-28 NOTE — DATA REVIEWED
[de-identified] : EXAM: MR SPINE CERVICAL IC\par  EXAM: MR SPINE THORACIC WAW IC\par \par PROCEDURE DATE: 07/09/2021\par \par \par \par INTERPRETATION: CLINICAL HISTORY: Trauma. Status post fall with upper extremity hyperesthesia. Evaluate for cord injury.\par \par TECHNIQUE: MRI of the cervical and thoracic spine with IV contrast.\par Limited postcontrast images of the cervical spine included sagittal and T1 fat saturated images.\par Thoracic spine consists of sagittal and transaxial T1 and sagittal STIR images precontrast. Postcontrast sagittal and transaxial T1 fat satted images are provided.\par 7 mls of Gadavist gadolinium-based contrast was administered intravenously without untoward event. 0.5 mls discarded.\par \par COMPARISON: CT cervical spine from 7/8/2021.\par \par FINDINGS:\par \par Cervical spine:\par No abnormal epidural or cord enhancement. C5 and C6 vertebral enhancement corresponds to areas of edema on the noncontrast T2/STIR images and again is likely related to active degenerative changes. No additional abnormal marrow enhancement.\par \par No abnormal paraspinal enhancement.\par \par Thoracic spine:\par The thoracic alignment is intact. There are no acute fractures or evidence of traumatic malalignment. The vertebral body heights are maintained. No vertebral body marrow signal abnormality or enhancement.\par \par The thoracic cord is normal in signal intensity and morphology. No abnormal leptomeningeal or cord enhancement. The conus medullaris terminates at the T12/L1 level and is unremarkable. There is no epidural hematoma or collection.\par \par No significant disc herniation, canal stenosis or neural foraminal narrowing.\par \par The visualized mediastinum and lungs are unremarkable. Images submitted for abdomen demonstrate no acute abnormality.\par \par The paraspinal musculature is unremarkable.\par \par IMPRESSION:\par C5 and C6 vertebral body marrow enhancement likely related to active endplate degenerative changes.\par \par No thoracic spine fracture, cord compression or cord signal abnormality.

## 2021-11-16 ENCOUNTER — RX RENEWAL (OUTPATIENT)
Age: 76
End: 2021-11-16

## 2022-01-04 ENCOUNTER — RX RENEWAL (OUTPATIENT)
Age: 77
End: 2022-01-04

## 2022-03-24 ENCOUNTER — RX RENEWAL (OUTPATIENT)
Age: 77
End: 2022-03-24

## 2022-05-02 ENCOUNTER — RX RENEWAL (OUTPATIENT)
Age: 77
End: 2022-05-02

## 2022-06-08 ENCOUNTER — RX RENEWAL (OUTPATIENT)
Age: 77
End: 2022-06-08

## 2022-06-08 RX ORDER — GABAPENTIN 300 MG/1
300 CAPSULE ORAL 3 TIMES DAILY
Qty: 60 | Refills: 0 | Status: ACTIVE | COMMUNITY
Start: 2021-07-29 | End: 1900-01-01

## 2022-12-16 NOTE — PHYSICAL THERAPY INITIAL EVALUATION ADULT - IMPAIRMENTS FOUND, PT EVAL
Follow-up with primary care doctor and substance abuse facility regarding prescription for suboxone.   aerobic capacity/endurance/gait, locomotion, and balance

## 2023-01-03 NOTE — OCCUPATIONAL THERAPY INITIAL EVALUATION ADULT - LEVEL OF INDEPENDENCE: SUPINE/SIT, REHAB EVAL
No. DAQUAN screening performed.  STOP BANG Legend: 0-2 = LOW Risk; 3-4 = INTERMEDIATE Risk; 5-8 = HIGH Risk Received and left pt sitting in bedside chair

## 2024-05-06 ENCOUNTER — APPOINTMENT (OUTPATIENT)
Dept: ORTHOPEDIC SURGERY | Facility: CLINIC | Age: 79
End: 2024-05-06
Payer: MEDICARE

## 2024-05-06 VITALS — HEIGHT: 65 IN | WEIGHT: 140 LBS | BODY MASS INDEX: 23.32 KG/M2

## 2024-05-06 DIAGNOSIS — I10 ESSENTIAL (PRIMARY) HYPERTENSION: ICD-10-CM

## 2024-05-06 DIAGNOSIS — E78.00 PURE HYPERCHOLESTEROLEMIA, UNSPECIFIED: ICD-10-CM

## 2024-05-06 DIAGNOSIS — M17.12 UNILATERAL PRIMARY OSTEOARTHRITIS, LEFT KNEE: ICD-10-CM

## 2024-05-06 PROCEDURE — 99203 OFFICE O/P NEW LOW 30 MIN: CPT

## 2024-05-06 PROCEDURE — 73564 X-RAY EXAM KNEE 4 OR MORE: CPT | Mod: LT

## 2024-05-08 PROBLEM — M17.12 PRIMARY OSTEOARTHRITIS OF LEFT KNEE: Status: ACTIVE | Noted: 2024-05-08

## 2024-05-08 NOTE — ASSESSMENT
[FreeTextEntry1] : The patient is a 77 yo woman presenting with left knee pain of 6 months without trauma. She reports using a cane for balance due to instability and occasional buckling. She has pain consistent with OA. She has pain with walking distances and stairs. She has pain at night as well. She has pain with overuse and fatigue. The patient had an injection on 3/18 and has had much less pain. She is also using tramadol and mobic with good pain relief as well.   Left knee exam: Well appearing female in no apparent distress. No rashes, scars, or abrasions. Neurovascularly intact. Tender to palpation at medial joint line. Ranging from 0-118. Mild + Varus deformity. Anterior/posterior drawer negative, - Mcmurrays. - Lachmans. Screening exam of the left hip shows a full, painless ROM.  X-rays done in the office today left knee 4 views weightbearing show moderate medial joint space narrowing.  There is also evidence of patellofemoral arthritis.  There are no obvious tumors, masses or calcifications seen.  The plan at this time is to modify her activities.  She seems to be doing well with the last cortisone injection so no further treatment is offered today.  I will see her back in the office as needed.

## 2024-05-08 NOTE — HISTORY OF PRESENT ILLNESS
[4] : 4 [] : yes [FreeTextEntry1] : LT knee [FreeTextEntry5] : 05/06/2024: 77yo male presenting with LT knee pain. No known injury/fall. Experiencing burning pain for about 6 months. Has seen pain management doctor, had x-ray done at [facility] and referred to orthopedic. Most pain at night time. Using cane to ambulate.

## 2024-07-16 NOTE — ED PROVIDER NOTE - STRIKING AGAINST
Quality 431: Preventive Care And Screening: Unhealthy Alcohol Use - Screening: Patient not identified as an unhealthy alcohol user when screened for unhealthy alcohol use using a systematic screening method
Quality 47: Advance Care Plan: Advance Care Planning discussed and documented in the medical record; patient did not wish or was not able to name a surrogate decision maker or provide an advance care plan.
Detail Level: Detailed
Quality 226: Preventive Care And Screening: Tobacco Use: Screening And Cessation Intervention: Patient screened for tobacco use and is an ex/non-smoker
bricks

## 2024-08-27 ENCOUNTER — APPOINTMENT (OUTPATIENT)
Dept: ORTHOPEDIC SURGERY | Facility: CLINIC | Age: 79
End: 2024-08-27
Payer: MEDICARE

## 2024-08-27 DIAGNOSIS — M17.12 UNILATERAL PRIMARY OSTEOARTHRITIS, LEFT KNEE: ICD-10-CM

## 2024-08-27 PROCEDURE — 99213 OFFICE O/P EST LOW 20 MIN: CPT

## 2024-08-27 RX ORDER — TRAMADOL HYDROCHLORIDE 25 MG/1
TABLET, COATED ORAL
Refills: 0 | Status: ACTIVE | COMMUNITY

## 2024-08-27 RX ORDER — DICLOFENAC POTASSIUM 50 MG/1
TABLET, COATED ORAL
Refills: 0 | Status: ACTIVE | COMMUNITY

## 2024-08-27 NOTE — ASSESSMENT
[FreeTextEntry1] : The patient is here for left knee pain follow up. He has been using tramadol and diclofenac with very good relief. The patient was sent by his pain management physician for re-eval. He declines wanting an injection today. She has pain consistent with OA. She has pain with walking distances and stairs. She has pain at night as well. She has pain with overuse and fatigue. The patient had an injection on 3/18 and has had much less pain. She is also using tramadol and mobic with good pain relief as well.   Left knee exam: Well appearing female in no apparent distress. No rashes, scars, or abrasions. Neurovascularly intact. Tender to palpation at medial joint line. Ranging from 0-118. Mild + Varus deformity. Anterior/posterior drawer negative, - Mcmurrays. - Lachmans. Screening exam of the left hip shows a full, painless ROM.  The patient is doing better with his oral pain medications. He declined a CSI of the left knee. He has moderate to severe OA and will return when a CSI is needed.

## 2025-05-28 NOTE — ED ADULT NURSE NOTE - CAS EDN DISCHARGE INTERVENTIONS
[Time Spent: ___ minutes] : I have spent [unfilled] minutes of time on the encounter which excludes teaching and separately reported services.
Arm band on/IV intact/Admission wristband placed